# Patient Record
Sex: MALE | Race: WHITE | NOT HISPANIC OR LATINO | Employment: FULL TIME | URBAN - METROPOLITAN AREA
[De-identification: names, ages, dates, MRNs, and addresses within clinical notes are randomized per-mention and may not be internally consistent; named-entity substitution may affect disease eponyms.]

---

## 2023-05-08 ENCOUNTER — TELEPHONE (OUTPATIENT)
Dept: PRIMARY CARE | Facility: CLINIC | Age: 53
End: 2023-05-08
Payer: COMMERCIAL

## 2023-05-13 ENCOUNTER — OFFICE VISIT (OUTPATIENT)
Dept: PRIMARY CARE | Facility: CLINIC | Age: 53
End: 2023-05-13
Payer: COMMERCIAL

## 2023-05-13 VITALS
SYSTOLIC BLOOD PRESSURE: 134 MMHG | BODY MASS INDEX: 45.1 KG/M2 | HEIGHT: 70 IN | WEIGHT: 315 LBS | DIASTOLIC BLOOD PRESSURE: 80 MMHG | OXYGEN SATURATION: 98 % | HEART RATE: 70 BPM

## 2023-05-13 DIAGNOSIS — Z13.0 SCREENING FOR DEFICIENCY ANEMIA: ICD-10-CM

## 2023-05-13 DIAGNOSIS — G47.33 OSA (OBSTRUCTIVE SLEEP APNEA): ICD-10-CM

## 2023-05-13 DIAGNOSIS — E55.9 VITAMIN D INSUFFICIENCY: ICD-10-CM

## 2023-05-13 DIAGNOSIS — K21.9 GASTROESOPHAGEAL REFLUX DISEASE, UNSPECIFIED WHETHER ESOPHAGITIS PRESENT: ICD-10-CM

## 2023-05-13 DIAGNOSIS — Z12.5 PROSTATE CANCER SCREENING: ICD-10-CM

## 2023-05-13 DIAGNOSIS — E78.5 HYPERLIPIDEMIA, UNSPECIFIED HYPERLIPIDEMIA TYPE: ICD-10-CM

## 2023-05-13 DIAGNOSIS — K58.9 IRRITABLE BOWEL SYNDROME, UNSPECIFIED TYPE: ICD-10-CM

## 2023-05-13 DIAGNOSIS — I25.10 CORONARY ARTERY DISEASE, UNSPECIFIED VESSEL OR LESION TYPE, UNSPECIFIED WHETHER ANGINA PRESENT, UNSPECIFIED WHETHER NATIVE OR TRANSPLANTED HEART: ICD-10-CM

## 2023-05-13 DIAGNOSIS — Z13.29 SCREENING FOR THYROID DISORDER: ICD-10-CM

## 2023-05-13 DIAGNOSIS — E66.01 MORBIDLY OBESE (MULTI): ICD-10-CM

## 2023-05-13 DIAGNOSIS — E29.1 HYPOGONADISM, MALE: ICD-10-CM

## 2023-05-13 DIAGNOSIS — Z12.11 COLON CANCER SCREENING: ICD-10-CM

## 2023-05-13 DIAGNOSIS — Z00.00 ROUTINE GENERAL MEDICAL EXAMINATION AT A HEALTH CARE FACILITY: Primary | ICD-10-CM

## 2023-05-13 DIAGNOSIS — Z13.6 ENCOUNTER FOR SCREENING FOR CARDIOVASCULAR DISORDERS: ICD-10-CM

## 2023-05-13 DIAGNOSIS — E53.8 VITAMIN B12 DEFICIENCY: ICD-10-CM

## 2023-05-13 DIAGNOSIS — I10 PRIMARY HYPERTENSION: ICD-10-CM

## 2023-05-13 DIAGNOSIS — E11.69 TYPE 2 DIABETES MELLITUS WITH OTHER SPECIFIED COMPLICATION, UNSPECIFIED WHETHER LONG TERM INSULIN USE (MULTI): ICD-10-CM

## 2023-05-13 PROBLEM — E11.65 UNCONTROLLED TYPE 2 DIABETES MELLITUS WITH HYPERGLYCEMIA (MULTI): Status: ACTIVE | Noted: 2023-05-13

## 2023-05-13 PROBLEM — N52.9 ERECTILE DYSFUNCTION: Status: ACTIVE | Noted: 2023-05-13

## 2023-05-13 PROBLEM — U07.1 COVID-19: Status: RESOLVED | Noted: 2023-05-13 | Resolved: 2023-05-13

## 2023-05-13 PROBLEM — E11.9 DIABETES MELLITUS (MULTI): Status: ACTIVE | Noted: 2023-05-13

## 2023-05-13 PROBLEM — J20.9 ACUTE BRONCHITIS WITH BRONCHOSPASM: Status: ACTIVE | Noted: 2023-05-13

## 2023-05-13 PROBLEM — R05.9 COUGH: Status: ACTIVE | Noted: 2023-05-13

## 2023-05-13 PROBLEM — U07.1 COVID-19: Status: ACTIVE | Noted: 2023-05-13

## 2023-05-13 PROBLEM — J06.9 URI, ACUTE: Status: ACTIVE | Noted: 2023-05-13

## 2023-05-13 PROBLEM — J06.9 URI, ACUTE: Status: RESOLVED | Noted: 2023-05-13 | Resolved: 2023-05-13

## 2023-05-13 PROBLEM — E11.9 NEW ONSET TYPE 2 DIABETES MELLITUS (MULTI): Status: ACTIVE | Noted: 2023-05-13

## 2023-05-13 PROBLEM — M51.37 DDD (DEGENERATIVE DISC DISEASE), LUMBOSACRAL: Status: ACTIVE | Noted: 2023-05-13

## 2023-05-13 PROBLEM — R52 PAIN, ACUTE: Status: RESOLVED | Noted: 2023-05-13 | Resolved: 2023-05-13

## 2023-05-13 PROBLEM — J20.9 ACUTE BRONCHITIS WITH BRONCHOSPASM: Status: RESOLVED | Noted: 2023-05-13 | Resolved: 2023-05-13

## 2023-05-13 PROBLEM — R52 PAIN, ACUTE: Status: ACTIVE | Noted: 2023-05-13

## 2023-05-13 PROBLEM — J01.90 ACUTE SINUSITIS: Status: ACTIVE | Noted: 2023-05-13

## 2023-05-13 PROBLEM — M43.10 SPL (SPONDYLOLISTHESIS): Status: ACTIVE | Noted: 2023-05-13

## 2023-05-13 PROBLEM — J01.90 ACUTE SINUSITIS: Status: RESOLVED | Noted: 2023-05-13 | Resolved: 2023-05-13

## 2023-05-13 PROBLEM — I48.0 PAROXYSMAL ATRIAL FIBRILLATION WITH RVR (MULTI): Status: ACTIVE | Noted: 2023-05-13

## 2023-05-13 PROBLEM — E11.9 NEW ONSET TYPE 2 DIABETES MELLITUS (MULTI): Status: RESOLVED | Noted: 2023-05-13 | Resolved: 2023-05-13

## 2023-05-13 PROBLEM — J21.9 BRONCHIOLITIS: Status: RESOLVED | Noted: 2023-05-13 | Resolved: 2023-05-13

## 2023-05-13 PROBLEM — R05.9 COUGH: Status: RESOLVED | Noted: 2023-05-13 | Resolved: 2023-05-13

## 2023-05-13 PROBLEM — M51.379 DDD (DEGENERATIVE DISC DISEASE), LUMBOSACRAL: Status: ACTIVE | Noted: 2023-05-13

## 2023-05-13 PROBLEM — J21.9 BRONCHIOLITIS: Status: ACTIVE | Noted: 2023-05-13

## 2023-05-13 PROBLEM — Z20.822 SUSPECTED COVID-19 VIRUS INFECTION: Status: RESOLVED | Noted: 2023-05-13 | Resolved: 2023-05-13

## 2023-05-13 PROBLEM — I48.92 ATRIAL FLUTTER WITH RAPID VENTRICULAR RESPONSE (MULTI): Status: ACTIVE | Noted: 2023-05-13

## 2023-05-13 PROBLEM — Z20.822 SUSPECTED COVID-19 VIRUS INFECTION: Status: ACTIVE | Noted: 2023-05-13

## 2023-05-13 PROBLEM — Z95.5 PRESENCE OF STENT IN CORONARY ARTERY: Status: ACTIVE | Noted: 2023-05-13

## 2023-05-13 PROBLEM — S83.249A TEAR OF MEDIAL MENISCUS OF KNEE: Status: ACTIVE | Noted: 2023-05-13

## 2023-05-13 PROBLEM — E11.65 UNCONTROLLED TYPE 2 DIABETES MELLITUS WITH HYPERGLYCEMIA (MULTI): Status: RESOLVED | Noted: 2023-05-13 | Resolved: 2023-05-13

## 2023-05-13 PROCEDURE — 3075F SYST BP GE 130 - 139MM HG: CPT | Performed by: STUDENT IN AN ORGANIZED HEALTH CARE EDUCATION/TRAINING PROGRAM

## 2023-05-13 PROCEDURE — 3079F DIAST BP 80-89 MM HG: CPT | Performed by: STUDENT IN AN ORGANIZED HEALTH CARE EDUCATION/TRAINING PROGRAM

## 2023-05-13 PROCEDURE — 4010F ACE/ARB THERAPY RXD/TAKEN: CPT | Performed by: STUDENT IN AN ORGANIZED HEALTH CARE EDUCATION/TRAINING PROGRAM

## 2023-05-13 PROCEDURE — 99396 PREV VISIT EST AGE 40-64: CPT | Performed by: STUDENT IN AN ORGANIZED HEALTH CARE EDUCATION/TRAINING PROGRAM

## 2023-05-13 RX ORDER — HYDROCHLOROTHIAZIDE 12.5 MG/1
12.5 TABLET ORAL DAILY
COMMUNITY
End: 2024-02-07

## 2023-05-13 RX ORDER — METFORMIN HYDROCHLORIDE 500 MG/1
2 TABLET, EXTENDED RELEASE ORAL 2 TIMES DAILY
COMMUNITY
End: 2023-06-27 | Stop reason: SDUPTHER

## 2023-05-13 RX ORDER — ATORVASTATIN CALCIUM 20 MG/1
20 TABLET, FILM COATED ORAL DAILY
COMMUNITY
End: 2024-02-07

## 2023-05-13 RX ORDER — BLOOD SUGAR DIAGNOSTIC
STRIP MISCELLANEOUS
COMMUNITY
Start: 2019-01-29 | End: 2023-05-13 | Stop reason: ALTCHOICE

## 2023-05-13 RX ORDER — LOSARTAN POTASSIUM 100 MG/1
100 TABLET ORAL DAILY
COMMUNITY
End: 2024-02-07

## 2023-05-13 RX ORDER — ASPIRIN 81 MG/1
1 TABLET ORAL DAILY
COMMUNITY
End: 2023-05-13 | Stop reason: ALTCHOICE

## 2023-05-13 RX ORDER — LOSARTAN POTASSIUM 50 MG/1
TABLET ORAL
COMMUNITY
Start: 2018-12-18 | End: 2023-05-13 | Stop reason: ALTCHOICE

## 2023-05-13 RX ORDER — METOPROLOL TARTRATE 100 MG/1
100 TABLET ORAL 2 TIMES DAILY
COMMUNITY
End: 2024-02-01 | Stop reason: SDUPTHER

## 2023-05-13 RX ORDER — DEXTROSE 4 G
TABLET,CHEWABLE ORAL
Qty: 1 EACH | Refills: 0 | Status: SHIPPED | OUTPATIENT
Start: 2023-05-13

## 2023-05-13 RX ORDER — LANCETS
EACH MISCELLANEOUS
Qty: 100 EACH | Refills: 3 | Status: SHIPPED | OUTPATIENT
Start: 2023-05-13

## 2023-05-13 RX ORDER — TAMSULOSIN HYDROCHLORIDE 0.4 MG/1
0.4 CAPSULE ORAL DAILY
COMMUNITY
Start: 2023-02-02 | End: 2024-05-08 | Stop reason: ALTCHOICE

## 2023-05-13 RX ORDER — TADALAFIL 10 MG/1
10 TABLET ORAL DAILY PRN
COMMUNITY
Start: 2021-08-12

## 2023-05-13 RX ORDER — ERGOCALCIFEROL 1.25 MG/1
1 CAPSULE ORAL
COMMUNITY
Start: 2021-08-12 | End: 2023-05-13 | Stop reason: ALTCHOICE

## 2023-05-13 RX ORDER — BLOOD SUGAR DIAGNOSTIC
STRIP MISCELLANEOUS
Qty: 100 STRIP | Refills: 3 | Status: SHIPPED | OUTPATIENT
Start: 2023-05-13

## 2023-05-13 NOTE — PATIENT INSTRUCTIONS
1.  Wellness visit establish care.  Screening labs ordered today.  Please have labs performed while fasting drinking only water in the morning and close to 8 AM for most accurate testosterone results.  Due for screening colonoscopy ordered today.    2.  Continue routine follow-ups with cardiology and current medications.    3.  Diabetes.  Repeat labs today.  If elevated we will likely add a new injectable such as Ozempic, Trulicity, or Mounjaro.  Diabetes can be progressive (requiring more medications with time). If we can't regulate your Hemoglobin A1C with current meds we will add more in the future and consider Insulin therapy. Persistently elevated blood sugars can damage small blood vessels and nerves in the brain, eyes, heart, kidneys and feet. You should be getting yearly dilated eye exams. You should be inspecting your feet daily for any injuries. Also do not walk bare foot indoors or outside. Weight loss (to a BMI 30) and regular exercise (30 minutes per day) can greatly improve your need for medications for your diabetes. A hemoglobin A1c (3 month blood sugar average) will be checked 1-4 times per year to help monitor you blood sugar control.We are aggressive at treating diabetes because it is a significant risk factor for heart disease and stroke. Please reduce the amount of carbohydrates you are eating, and instead try eating more lean protein such as fish, turkey, and chicken. Please try to follow an ADA (American Diabetes Association) diet.    #4.  Concern for low testosterone.  Levels ordered today.    Below are other ideas to help you boost your testosterone naturally.  Lose weight, increased body fat leads to higher estrogen and lower Testosterone. Eat quality fats and quality cholesterol foods as they are testosterone precursors (olive oil, eggs, almonds, coconut oil, avocados, healthy fish oils). Eat broccoli and cauliflower, as these contain DIM, which is vital in decreasing estrogen response in  the body. Supplements should include Zinc and Vitamin D, these are both important for Testosterone production. Get plenty of sleep, 7 to 9 hours each night. Daily exercise with compound movements, stimulating large muscle groups early in the morning helps naturally boost T (Squats, bench, dead lifts…etc). Workouts should have short breaks, failure at 6 to 10 reps, and not last longer than 45 mins.  Focus on lowering stress, high cortisol the stress hormone can contribute to suppressing your body´s own testosterone. Avoid Vegetable oils, avoid non-fat yogurts, avoid flax seed, avoid Trans Fats, no hydrogenated fats. Healthy natural testosterone boosters include Tribulus terrestres, Ashwaghanda, Eurycoma longifolia, and Epimedium (horny goat weed).     #5.  Symptoms of diarrhea predominant IBS.  Most likely related to food.  Advised on avoiding gluten products.  Advised on daily probiotics.  Consider daily fiber supplements.

## 2023-05-13 NOTE — PROGRESS NOTES
"Subjective   Patient ID: Vipul Townsend is a 52 y.o. male who presents for New Patient Visit and GI Problem.    HPI comes in to establish care with a new PCP.  Due for wellness labs.  History of diabetes, heart disease.  Acid reflux.  Concern for low T.    Review of Systems  Constitutional: NO F, chills, or sweats  Eyes: no blurred vision or visual disturbance  ENT: no hearing loss, no congestion, no nasal discharge, no hoarseness and no sore throat.   Cardiovascular: no chest pain, no edema, no palps and no syncope.   Respiratory: no cough,no s.o.b. and no wheezing  Gastrointestinal: no abdominal pain, No C/D no N/V, no blood in stools  Genitourinary: no dysuria, no change in urinary frequency, no urinary hesitancy and no feelings of urinary urgency.   Musculoskeletal: no arthralgias,  no back pain and no myalgias.   Integumentary: no new skin lesions and no rashes.   Neurological: no difficulty walking, no headache, no limb weakness, no numbness and no tingling.   Psychiatric: no anxiety, no depression, no anhedonia and no substance use disorders.   Endocrine: no recent weight gain and no recent weight loss.   Hematologic/Lymphatic: no tendency for easy bruising and no swollen glands.  Objective   /80 (BP Location: Left arm, Patient Position: Sitting, BP Cuff Size: Large adult)   Pulse 70   Ht 1.775 m (5' 9.88\")   Wt 144 kg (317 lb 3.2 oz)   SpO2 98%   BMI 45.67 kg/m²     Physical Exam  gen- a & o x 3, nad, pleasant  heent- eomi, perrla, ear canals patent, TM's non-erythematous, no fluid, frontal and maxillary sinus's nontender  neck- supple, nontender, no palpable or enlarged nodes, no thyromegaly  heart- rrr, no murmurs  lungs- cta b/l , no w/r/r  chest- symmetric, nontender  ab- soft, nontender, no palpable organomegaly, postive bowel sounds  ex's- no c/c/e  neuro- CNs 2-12 grossly intact, full sensation and strength in all extremities    Assessment/Plan     1.  Wellness visit establish care.  Screening " labs ordered today.  Please have labs performed while fasting drinking only water in the morning and close to 8 AM for most accurate testosterone results.  Due for screening colonoscopy ordered today.    2.  Continue routine follow-ups with cardiology and current medications.    3.  Diabetes.  Repeat labs today.  If elevated we will likely add a new injectable such as Ozempic, Trulicity, or Mounjaro.  Diabetes can be progressive (requiring more medications with time). If we can't regulate your Hemoglobin A1C with current meds we will add more in the future and consider Insulin therapy. Persistently elevated blood sugars can damage small blood vessels and nerves in the brain, eyes, heart, kidneys and feet. You should be getting yearly dilated eye exams. You should be inspecting your feet daily for any injuries. Also do not walk bare foot indoors or outside. Weight loss (to a BMI 30) and regular exercise (30 minutes per day) can greatly improve your need for medications for your diabetes. A hemoglobin A1c (3 month blood sugar average) will be checked 1-4 times per year to help monitor you blood sugar control.We are aggressive at treating diabetes because it is a significant risk factor for heart disease and stroke. Please reduce the amount of carbohydrates you are eating, and instead try eating more lean protein such as fish, turkey, and chicken. Please try to follow an ADA (American Diabetes Association) diet.    #4.  Concern for low testosterone.  Levels ordered today.    Below are other ideas to help you boost your testosterone naturally.  Lose weight, increased body fat leads to higher estrogen and lower Testosterone. Eat quality fats and quality cholesterol foods as they are testosterone precursors (olive oil, eggs, almonds, coconut oil, avocados, healthy fish oils). Eat broccoli and cauliflower, as these contain DIM, which is vital in decreasing estrogen response in the body. Supplements should include Zinc and  Vitamin D, these are both important for Testosterone production. Get plenty of sleep, 7 to 9 hours each night. Daily exercise with compound movements, stimulating large muscle groups early in the morning helps naturally boost T (Squats, bench, dead lifts…etc). Workouts should have short breaks, failure at 6 to 10 reps, and not last longer than 45 mins.  Focus on lowering stress, high cortisol the stress hormone can contribute to suppressing your body´s own testosterone. Avoid Vegetable oils, avoid non-fat yogurts, avoid flax seed, avoid Trans Fats, no hydrogenated fats. Healthy natural testosterone boosters include Tribulus terrestres, Ashwaghanda, Eurycoma longifolia, and Epimedium (horny goat weed).     #5.  Symptoms of diarrhea predominant IBS.  Most likely related to food.  Advised on avoiding gluten products.  Advised on daily probiotics.  Consider daily fiber supplements.

## 2023-05-17 ENCOUNTER — LAB (OUTPATIENT)
Dept: LAB | Facility: LAB | Age: 53
End: 2023-05-17
Payer: COMMERCIAL

## 2023-05-17 DIAGNOSIS — I25.10 CORONARY ARTERY DISEASE, UNSPECIFIED VESSEL OR LESION TYPE, UNSPECIFIED WHETHER ANGINA PRESENT, UNSPECIFIED WHETHER NATIVE OR TRANSPLANTED HEART: ICD-10-CM

## 2023-05-17 DIAGNOSIS — K58.9 IRRITABLE BOWEL SYNDROME, UNSPECIFIED TYPE: ICD-10-CM

## 2023-05-17 DIAGNOSIS — G47.33 OSA (OBSTRUCTIVE SLEEP APNEA): ICD-10-CM

## 2023-05-17 DIAGNOSIS — E29.1 HYPOGONADISM, MALE: ICD-10-CM

## 2023-05-17 DIAGNOSIS — Z12.5 PROSTATE CANCER SCREENING: ICD-10-CM

## 2023-05-17 DIAGNOSIS — E78.5 HYPERLIPIDEMIA, UNSPECIFIED HYPERLIPIDEMIA TYPE: ICD-10-CM

## 2023-05-17 DIAGNOSIS — Z13.6 ENCOUNTER FOR SCREENING FOR CARDIOVASCULAR DISORDERS: ICD-10-CM

## 2023-05-17 DIAGNOSIS — Z00.00 ROUTINE GENERAL MEDICAL EXAMINATION AT A HEALTH CARE FACILITY: ICD-10-CM

## 2023-05-17 DIAGNOSIS — Z12.11 COLON CANCER SCREENING: ICD-10-CM

## 2023-05-17 DIAGNOSIS — Z13.29 SCREENING FOR THYROID DISORDER: ICD-10-CM

## 2023-05-17 DIAGNOSIS — E55.9 VITAMIN D INSUFFICIENCY: ICD-10-CM

## 2023-05-17 DIAGNOSIS — Z13.0 SCREENING FOR DEFICIENCY ANEMIA: ICD-10-CM

## 2023-05-17 DIAGNOSIS — E66.01 MORBIDLY OBESE (MULTI): ICD-10-CM

## 2023-05-17 DIAGNOSIS — K21.9 GASTROESOPHAGEAL REFLUX DISEASE, UNSPECIFIED WHETHER ESOPHAGITIS PRESENT: ICD-10-CM

## 2023-05-17 DIAGNOSIS — E53.8 VITAMIN B12 DEFICIENCY: ICD-10-CM

## 2023-05-17 DIAGNOSIS — I10 PRIMARY HYPERTENSION: ICD-10-CM

## 2023-05-17 DIAGNOSIS — E11.69 TYPE 2 DIABETES MELLITUS WITH OTHER SPECIFIED COMPLICATION, UNSPECIFIED WHETHER LONG TERM INSULIN USE (MULTI): ICD-10-CM

## 2023-05-17 LAB
ALANINE AMINOTRANSFERASE (SGPT) (U/L) IN SER/PLAS: 28 U/L (ref 10–52)
ALBUMIN (G/DL) IN SER/PLAS: 4.7 G/DL (ref 3.4–5)
ALKALINE PHOSPHATASE (U/L) IN SER/PLAS: 49 U/L (ref 33–120)
ANION GAP IN SER/PLAS: 15 MMOL/L (ref 10–20)
APPEARANCE, URINE: ABNORMAL
ASPARTATE AMINOTRANSFERASE (SGOT) (U/L) IN SER/PLAS: 20 U/L (ref 9–39)
BILIRUBIN TOTAL (MG/DL) IN SER/PLAS: 0.6 MG/DL (ref 0–1.2)
BILIRUBIN, URINE: NEGATIVE
BLOOD, URINE: NEGATIVE
CALCIDIOL (25 OH VITAMIN D3) (NG/ML) IN SER/PLAS: 31 NG/ML
CALCIUM (MG/DL) IN SER/PLAS: 10.1 MG/DL (ref 8.6–10.6)
CALCIUM OXALATE CRYSTALS, URINE: NORMAL /HPF
CARBON DIOXIDE, TOTAL (MMOL/L) IN SER/PLAS: 28 MMOL/L (ref 21–32)
CHLORIDE (MMOL/L) IN SER/PLAS: 98 MMOL/L (ref 98–107)
CHOLESTEROL (MG/DL) IN SER/PLAS: 92 MG/DL (ref 0–199)
CHOLESTEROL IN HDL (MG/DL) IN SER/PLAS: 44.1 MG/DL
CHOLESTEROL/HDL RATIO: 2.1
COBALAMIN (VITAMIN B12) (PG/ML) IN SER/PLAS: 282 PG/ML (ref 211–911)
COLOR, URINE: YELLOW
CREATININE (MG/DL) IN SER/PLAS: 0.84 MG/DL (ref 0.5–1.3)
ESTIMATED AVERAGE GLUCOSE FOR HBA1C: 232 MG/DL
GFR MALE: >90 ML/MIN/1.73M2
GLUCOSE (MG/DL) IN SER/PLAS: 271 MG/DL (ref 74–99)
GLUCOSE, URINE: ABNORMAL MG/DL
HEMOGLOBIN A1C/HEMOGLOBIN TOTAL IN BLOOD: 9.7 %
KETONES, URINE: ABNORMAL MG/DL
LDL: 28 MG/DL (ref 0–99)
LEUKOCYTE ESTERASE, URINE: NEGATIVE
NITRITE, URINE: NEGATIVE
PH, URINE: 5 (ref 5–8)
POTASSIUM (MMOL/L) IN SER/PLAS: 4.2 MMOL/L (ref 3.5–5.3)
PROTEIN TOTAL: 7.4 G/DL (ref 6.4–8.2)
PROTEIN, URINE: ABNORMAL MG/DL
RBC, URINE: 3 /HPF (ref 0–5)
SODIUM (MMOL/L) IN SER/PLAS: 137 MMOL/L (ref 136–145)
SPECIFIC GRAVITY, URINE: 1.03 (ref 1–1.03)
THYROTROPIN (MIU/L) IN SER/PLAS BY DETECTION LIMIT <= 0.05 MIU/L: 1.39 MIU/L (ref 0.44–3.98)
TRIGLYCERIDE (MG/DL) IN SER/PLAS: 100 MG/DL (ref 0–149)
UREA NITROGEN (MG/DL) IN SER/PLAS: 12 MG/DL (ref 6–23)
UROBILINOGEN, URINE: <2 MG/DL (ref 0–1.9)
VLDL: 20 MG/DL (ref 0–40)
WBC, URINE: NORMAL /HPF (ref 0–5)

## 2023-05-17 PROCEDURE — 83036 HEMOGLOBIN GLYCOSYLATED A1C: CPT

## 2023-05-17 PROCEDURE — 81001 URINALYSIS AUTO W/SCOPE: CPT

## 2023-05-17 PROCEDURE — 36415 COLL VENOUS BLD VENIPUNCTURE: CPT

## 2023-05-17 PROCEDURE — 84402 ASSAY OF FREE TESTOSTERONE: CPT

## 2023-05-17 PROCEDURE — 84154 ASSAY OF PSA FREE: CPT

## 2023-05-17 PROCEDURE — 84443 ASSAY THYROID STIM HORMONE: CPT

## 2023-05-17 PROCEDURE — 80061 LIPID PANEL: CPT

## 2023-05-17 PROCEDURE — 82607 VITAMIN B-12: CPT

## 2023-05-17 PROCEDURE — 80053 COMPREHEN METABOLIC PANEL: CPT

## 2023-05-17 PROCEDURE — 84403 ASSAY OF TOTAL TESTOSTERONE: CPT

## 2023-05-17 PROCEDURE — 82306 VITAMIN D 25 HYDROXY: CPT

## 2023-05-17 PROCEDURE — 84153 ASSAY OF PSA TOTAL: CPT

## 2023-05-17 PROCEDURE — 86003 ALLG SPEC IGE CRUDE XTRC EA: CPT

## 2023-05-17 PROCEDURE — 86001 ALLERGEN SPECIFIC IGG: CPT

## 2023-05-18 LAB
ALLERGEN FOOD: GLUTEN IGE (KU/L): <0.1 KU/L
IMMUNOCAP INTERPRETATION: NORMAL

## 2023-05-22 LAB
PROSTATE SPECIFIC AG (NG/ML) IN SER/PLAS: 0.5 NG/ML (ref 0–4)
PROSTATE SPECIFIC AG FREE (NG/ML) IN SER/PLAS: 0.2 NG/ML
PROSTATE SPECIFIC AG FREE/PROSTATE SPECIFIC AG TOTAL IN SER/PLAS: 40 %

## 2023-05-23 LAB — GLUTEN IGG: 4.13 MCG/ML

## 2023-05-24 LAB
TESTOSTERONE FREE (CHAN): 36.2 PG/ML (ref 35–155)
TESTOSTERONE,TOTAL,LC-MS/MS: 248 NG/DL (ref 250–1100)

## 2023-05-26 ENCOUNTER — TELEPHONE (OUTPATIENT)
Dept: PRIMARY CARE | Facility: CLINIC | Age: 53
End: 2023-05-26
Payer: COMMERCIAL

## 2023-05-30 DIAGNOSIS — E11.69 TYPE 2 DIABETES MELLITUS WITH OTHER SPECIFIED COMPLICATION, UNSPECIFIED WHETHER LONG TERM INSULIN USE (MULTI): Primary | ICD-10-CM

## 2023-05-30 RX ORDER — TIRZEPATIDE 2.5 MG/.5ML
2.5 INJECTION, SOLUTION SUBCUTANEOUS
Qty: 2 ML | Refills: 0 | Status: SHIPPED | OUTPATIENT
Start: 2023-05-30 | End: 2023-06-27 | Stop reason: ALTCHOICE

## 2023-06-05 ENCOUNTER — HOSPITAL ENCOUNTER (OUTPATIENT)
Dept: DATA CONVERSION | Facility: HOSPITAL | Age: 53
End: 2023-06-05
Attending: INTERNAL MEDICINE | Admitting: INTERNAL MEDICINE
Payer: COMMERCIAL

## 2023-06-05 DIAGNOSIS — I11.0 HYPERTENSIVE HEART DISEASE WITH HEART FAILURE (MULTI): ICD-10-CM

## 2023-06-05 DIAGNOSIS — E11.9 TYPE 2 DIABETES MELLITUS WITHOUT COMPLICATIONS (MULTI): ICD-10-CM

## 2023-06-05 DIAGNOSIS — Z98.61 CORONARY ANGIOPLASTY STATUS: ICD-10-CM

## 2023-06-05 DIAGNOSIS — Z79.84 LONG TERM (CURRENT) USE OF ORAL HYPOGLYCEMIC DRUGS: ICD-10-CM

## 2023-06-05 DIAGNOSIS — D12.4 BENIGN NEOPLASM OF DESCENDING COLON: ICD-10-CM

## 2023-06-05 DIAGNOSIS — K63.5 POLYP OF COLON: ICD-10-CM

## 2023-06-05 DIAGNOSIS — I48.0 PAROXYSMAL ATRIAL FIBRILLATION (MULTI): ICD-10-CM

## 2023-06-05 DIAGNOSIS — E78.5 HYPERLIPIDEMIA, UNSPECIFIED: ICD-10-CM

## 2023-06-05 DIAGNOSIS — I25.10 ATHEROSCLEROTIC HEART DISEASE OF NATIVE CORONARY ARTERY WITHOUT ANGINA PECTORIS: ICD-10-CM

## 2023-06-05 DIAGNOSIS — Z87.891 PERSONAL HISTORY OF NICOTINE DEPENDENCE: ICD-10-CM

## 2023-06-05 DIAGNOSIS — D12.3 BENIGN NEOPLASM OF TRANSVERSE COLON: ICD-10-CM

## 2023-06-05 DIAGNOSIS — E66.9 OBESITY, UNSPECIFIED: ICD-10-CM

## 2023-06-05 DIAGNOSIS — G47.33 OBSTRUCTIVE SLEEP APNEA (ADULT) (PEDIATRIC): ICD-10-CM

## 2023-06-05 DIAGNOSIS — Z12.11 ENCOUNTER FOR SCREENING FOR MALIGNANT NEOPLASM OF COLON: ICD-10-CM

## 2023-06-05 DIAGNOSIS — D12.0 BENIGN NEOPLASM OF CECUM: ICD-10-CM

## 2023-06-05 DIAGNOSIS — I50.22 CHRONIC SYSTOLIC (CONGESTIVE) HEART FAILURE (MULTI): ICD-10-CM

## 2023-06-05 DIAGNOSIS — K64.9 UNSPECIFIED HEMORRHOIDS: ICD-10-CM

## 2023-06-05 DIAGNOSIS — I48.3 TYPICAL ATRIAL FLUTTER (MULTI): ICD-10-CM

## 2023-06-05 LAB — POCT GLUCOSE: 195 MG/DL (ref 74–99)

## 2023-06-09 LAB
COMPLETE PATHOLOGY REPORT: NORMAL
CONVERTED CLINICAL DIAGNOSIS-HISTORY: NORMAL
CONVERTED FINAL DIAGNOSIS: NORMAL
CONVERTED FINAL REPORT PDF LINK TO COPY AND PASTE: NORMAL
CONVERTED GROSS DESCRIPTION: NORMAL
CONVERTED MICROSCOPIC DESCRIPTION: NORMAL

## 2023-06-14 ENCOUNTER — TELEPHONE (OUTPATIENT)
Dept: PRIMARY CARE | Facility: CLINIC | Age: 53
End: 2023-06-14
Payer: COMMERCIAL

## 2023-06-14 RX ORDER — SOD SULF/POT CHLORIDE/MAG SULF 1.479 G
TABLET ORAL
COMMUNITY
Start: 2023-05-15 | End: 2024-05-08 | Stop reason: ALTCHOICE

## 2023-07-28 ENCOUNTER — TELEPHONE (OUTPATIENT)
Dept: PRIMARY CARE | Facility: CLINIC | Age: 53
End: 2023-07-28
Payer: COMMERCIAL

## 2023-10-17 RX ORDER — ERGOCALCIFEROL 1.25 MG/1
1.25 CAPSULE ORAL
COMMUNITY
End: 2024-05-08 | Stop reason: ALTCHOICE

## 2023-10-17 RX ORDER — ASPIRIN 81 MG/1
81 TABLET ORAL DAILY
COMMUNITY
End: 2024-05-08 | Stop reason: ALTCHOICE

## 2023-10-18 ENCOUNTER — APPOINTMENT (OUTPATIENT)
Dept: CARDIOLOGY | Facility: CLINIC | Age: 53
End: 2023-10-18
Payer: COMMERCIAL

## 2023-11-20 DIAGNOSIS — E11.69 TYPE 2 DIABETES MELLITUS WITH OTHER SPECIFIED COMPLICATION, UNSPECIFIED WHETHER LONG TERM INSULIN USE (MULTI): ICD-10-CM

## 2023-11-20 RX ORDER — TIRZEPATIDE 7.5 MG/.5ML
INJECTION, SOLUTION SUBCUTANEOUS
Qty: 2 ML | Refills: 3 | Status: SHIPPED | OUTPATIENT
Start: 2023-11-20 | End: 2024-03-20

## 2024-02-01 DIAGNOSIS — I48.92 ATRIAL FLUTTER WITH RAPID VENTRICULAR RESPONSE (MULTI): ICD-10-CM

## 2024-02-01 RX ORDER — METOPROLOL TARTRATE 100 MG/1
100 TABLET ORAL 2 TIMES DAILY
Qty: 180 TABLET | Refills: 3 | Status: SHIPPED | OUTPATIENT
Start: 2024-02-01 | End: 2024-02-08 | Stop reason: SDUPTHER

## 2024-02-07 DIAGNOSIS — I10 PRIMARY HYPERTENSION: ICD-10-CM

## 2024-02-07 DIAGNOSIS — E78.5 HYPERLIPIDEMIA, UNSPECIFIED HYPERLIPIDEMIA TYPE: ICD-10-CM

## 2024-02-07 RX ORDER — ATORVASTATIN CALCIUM 20 MG/1
20 TABLET, FILM COATED ORAL DAILY
Qty: 90 TABLET | Refills: 3 | Status: SHIPPED | OUTPATIENT
Start: 2024-02-07 | End: 2024-02-08 | Stop reason: SDUPTHER

## 2024-02-07 RX ORDER — LOSARTAN POTASSIUM 100 MG/1
100 TABLET ORAL DAILY
Qty: 90 TABLET | Refills: 3 | Status: SHIPPED | OUTPATIENT
Start: 2024-02-07 | End: 2024-02-08 | Stop reason: SDUPTHER

## 2024-02-07 RX ORDER — HYDROCHLOROTHIAZIDE 12.5 MG/1
12.5 TABLET ORAL DAILY
Qty: 90 TABLET | Refills: 3 | Status: SHIPPED | OUTPATIENT
Start: 2024-02-07 | End: 2024-02-08 | Stop reason: SDUPTHER

## 2024-02-08 ENCOUNTER — OFFICE VISIT (OUTPATIENT)
Dept: CARDIOLOGY | Facility: CLINIC | Age: 54
End: 2024-02-08
Payer: COMMERCIAL

## 2024-02-08 VITALS
BODY MASS INDEX: 40.23 KG/M2 | DIASTOLIC BLOOD PRESSURE: 80 MMHG | HEIGHT: 72 IN | HEART RATE: 96 BPM | OXYGEN SATURATION: 98 % | WEIGHT: 297 LBS | SYSTOLIC BLOOD PRESSURE: 130 MMHG

## 2024-02-08 DIAGNOSIS — I10 PRIMARY HYPERTENSION: ICD-10-CM

## 2024-02-08 DIAGNOSIS — I48.92 ATRIAL FLUTTER WITH RAPID VENTRICULAR RESPONSE (MULTI): ICD-10-CM

## 2024-02-08 DIAGNOSIS — I25.10 CORONARY ARTERY DISEASE INVOLVING NATIVE CORONARY ARTERY OF NATIVE HEART WITHOUT ANGINA PECTORIS: ICD-10-CM

## 2024-02-08 DIAGNOSIS — Z95.5 PRESENCE OF STENT IN CORONARY ARTERY: ICD-10-CM

## 2024-02-08 DIAGNOSIS — E78.5 HYPERLIPIDEMIA, UNSPECIFIED HYPERLIPIDEMIA TYPE: ICD-10-CM

## 2024-02-08 DIAGNOSIS — I15.9 SECONDARY HYPERTENSION: Primary | ICD-10-CM

## 2024-02-08 DIAGNOSIS — I48.0 PAROXYSMAL ATRIAL FIBRILLATION WITH RVR (MULTI): ICD-10-CM

## 2024-02-08 PROCEDURE — 4010F ACE/ARB THERAPY RXD/TAKEN: CPT | Performed by: INTERNAL MEDICINE

## 2024-02-08 PROCEDURE — 99213 OFFICE O/P EST LOW 20 MIN: CPT | Performed by: INTERNAL MEDICINE

## 2024-02-08 PROCEDURE — 3075F SYST BP GE 130 - 139MM HG: CPT | Performed by: INTERNAL MEDICINE

## 2024-02-08 PROCEDURE — 3079F DIAST BP 80-89 MM HG: CPT | Performed by: INTERNAL MEDICINE

## 2024-02-08 PROCEDURE — 3008F BODY MASS INDEX DOCD: CPT | Performed by: INTERNAL MEDICINE

## 2024-02-08 PROCEDURE — 93000 ELECTROCARDIOGRAM COMPLETE: CPT | Performed by: INTERNAL MEDICINE

## 2024-02-08 PROCEDURE — 1036F TOBACCO NON-USER: CPT | Performed by: INTERNAL MEDICINE

## 2024-02-08 RX ORDER — ATORVASTATIN CALCIUM 20 MG/1
20 TABLET, FILM COATED ORAL DAILY
Qty: 90 TABLET | Refills: 3 | Status: SHIPPED | OUTPATIENT
Start: 2024-02-08

## 2024-02-08 RX ORDER — LOSARTAN POTASSIUM 100 MG/1
100 TABLET ORAL DAILY
Qty: 90 TABLET | Refills: 3 | Status: SHIPPED | OUTPATIENT
Start: 2024-02-08

## 2024-02-08 RX ORDER — HYDROCHLOROTHIAZIDE 12.5 MG/1
12.5 TABLET ORAL DAILY
Qty: 90 TABLET | Refills: 3 | Status: SHIPPED | OUTPATIENT
Start: 2024-02-08

## 2024-02-08 RX ORDER — METOPROLOL TARTRATE 100 MG/1
100 TABLET ORAL 2 TIMES DAILY
Qty: 180 TABLET | Refills: 3 | Status: SHIPPED | OUTPATIENT
Start: 2024-02-08

## 2024-02-08 ASSESSMENT — ENCOUNTER SYMPTOMS
DYSPNEA ON EXERTION: 1
WEIGHT LOSS: 1

## 2024-02-08 NOTE — PROGRESS NOTES
Subjective   Vipul Townsend is a 53 y.o. male.    Chief Complaint:  Follow-up coronary artery disease, atrial fibrillation.    HPI    He has had rare episodes of palpitations.  They last briefly for seconds.  He has not had any sustained arrhythmias.  No chest pressure or heaviness.  No increased shortness of breath from his baseline.  Has lost a considerable amount of weight.  He feels much better after weight loss.  No other medical problems or hospitalizations since his last visit.    Cardiac catheterization in December 2019 which demonstrated mild coronary artery disease and patent stents in the left anterior descending coronary artery.     In 2020 the patient underwent a pulmonary vein isolation procedure. Subsequent to that he has been able to maintain sinus rhythm.     A stress thallium study in early December 2019 showed a left ventricular ejection fraction of 38%. An echocardiographic study performed on June 2, 2020 showed normal left ventricular systolic function with no significant valvular disease.     Cardiac catheterization in 2012 showed a /99 percent left anterior descending coronary artery treated with a drug-eluting stent.     He has obstructive sleep apnea and uses a cpap mask.    Allergies  Medication    · Trulicity SOPN   Recorded By: iLu Tapia; 8/31/2015 2:53:32 PM     Family History  Mother    · Family history of diabetes mellitus (V18.0) (Z83.3)  Father    · Family history of cardiac disorder (V17.49) (Z82.49)   · Family history of myocardial infarction (V17.3) (Z82.49)  Sister    · Family history of diabetes mellitus (V18.0) (Z83.3)  Maternal Grandmother    · Family history of diabetes mellitus (V18.0) (Z83.3)  Paternal Grandmother    · Family history of congestive heart failure (V17.49) (Z82.49)  Paternal Grandfather    · Family history of hypertension (V17.49) (Z82.49)     Social History  Problems    · Former smoker (V15.82) (Z87.891)   · quit 2009   · History of marijuana  use (305.23) (F12.91)   · quit 2015   ·    · Moderate alcohol use   · Nonsmoker (V49.89) (Z78.9)     Review of Systems   Constitutional: Positive for weight loss.   Cardiovascular:  Positive for dyspnea on exertion.   Musculoskeletal:  Positive for arthritis.   All other systems reviewed and are negative.      Visit Vitals  /80 (BP Location: Left arm)   Pulse 96   Ht 1.829 m (6')   Wt 135 kg (297 lb)   SpO2 98%   BMI 40.28 kg/m²   Smoking Status Former   BSA 2.62 m²        Objective     Constitutional:       Appearance: Not in distress.   Neck:      Vascular: JVD normal.   Pulmonary:      Breath sounds: Normal breath sounds.   Cardiovascular:      Normal rate. Regular rhythm. S1 with normal intensity. S2 with normal intensity.       Murmurs: There is no murmur.      No gallop.    Pulses:     Intact distal pulses.   Edema:     Peripheral edema absent.   Abdominal:      General: Bowel sounds are normal.   Neurological:      Mental Status: Alert and oriented to person, place and time.         Lab Review:   Lab Results   Component Value Date     05/17/2023    K 4.2 05/17/2023    CL 98 05/17/2023    CO2 28 05/17/2023    BUN 12 05/17/2023    CREATININE 0.84 05/17/2023    GLUCOSE 271 (H) 05/17/2023    CALCIUM 10.1 05/17/2023     Lab Results   Component Value Date    CHOL 92 05/17/2023    TRIG 100 05/17/2023    HDL 44.1 05/17/2023       Assessment:    1.  Coronary artery disease.  No anginal symptoms.  Activity levels are improving with weight loss.  Will continue current medical management which includes angiotensin receptor blocker, beta-blockers, and statin therapy    2.  Hypertension.  Blood pressures are well-controlled.    3.  Hyperlipidemia.  Most recent LDL is 28.

## 2024-02-28 ENCOUNTER — LAB (OUTPATIENT)
Dept: LAB | Facility: LAB | Age: 54
End: 2024-02-28
Payer: COMMERCIAL

## 2024-02-28 DIAGNOSIS — E66.01 MORBIDLY OBESE (MULTI): ICD-10-CM

## 2024-02-28 DIAGNOSIS — E53.8 VITAMIN B12 DEFICIENCY: ICD-10-CM

## 2024-02-28 DIAGNOSIS — Z12.11 COLON CANCER SCREENING: ICD-10-CM

## 2024-02-28 DIAGNOSIS — E78.5 HYPERLIPIDEMIA, UNSPECIFIED HYPERLIPIDEMIA TYPE: ICD-10-CM

## 2024-02-28 DIAGNOSIS — I25.10 CORONARY ARTERY DISEASE, UNSPECIFIED VESSEL OR LESION TYPE, UNSPECIFIED WHETHER ANGINA PRESENT, UNSPECIFIED WHETHER NATIVE OR TRANSPLANTED HEART: ICD-10-CM

## 2024-02-28 DIAGNOSIS — E55.9 VITAMIN D INSUFFICIENCY: ICD-10-CM

## 2024-02-28 DIAGNOSIS — I10 PRIMARY HYPERTENSION: ICD-10-CM

## 2024-02-28 DIAGNOSIS — E29.1 HYPOGONADISM, MALE: ICD-10-CM

## 2024-02-28 DIAGNOSIS — Z13.29 SCREENING FOR THYROID DISORDER: ICD-10-CM

## 2024-02-28 DIAGNOSIS — K21.9 GASTROESOPHAGEAL REFLUX DISEASE, UNSPECIFIED WHETHER ESOPHAGITIS PRESENT: ICD-10-CM

## 2024-02-28 DIAGNOSIS — G47.33 OSA (OBSTRUCTIVE SLEEP APNEA): ICD-10-CM

## 2024-02-28 DIAGNOSIS — Z13.6 ENCOUNTER FOR SCREENING FOR CARDIOVASCULAR DISORDERS: ICD-10-CM

## 2024-02-28 DIAGNOSIS — Z13.0 SCREENING FOR DEFICIENCY ANEMIA: ICD-10-CM

## 2024-02-28 DIAGNOSIS — Z12.5 PROSTATE CANCER SCREENING: ICD-10-CM

## 2024-02-28 DIAGNOSIS — Z00.00 ROUTINE GENERAL MEDICAL EXAMINATION AT A HEALTH CARE FACILITY: ICD-10-CM

## 2024-02-28 DIAGNOSIS — E11.69 TYPE 2 DIABETES MELLITUS WITH OTHER SPECIFIED COMPLICATION, UNSPECIFIED WHETHER LONG TERM INSULIN USE (MULTI): ICD-10-CM

## 2024-02-28 LAB
ALBUMIN SERPL BCP-MCNC: 4.4 G/DL (ref 3.4–5)
ALP SERPL-CCNC: 43 U/L (ref 33–120)
ALT SERPL W P-5'-P-CCNC: 20 U/L (ref 10–52)
ANION GAP SERPL CALC-SCNC: 16 MMOL/L (ref 10–20)
APPEARANCE UR: ABNORMAL
AST SERPL W P-5'-P-CCNC: 14 U/L (ref 9–39)
BASOPHILS # BLD AUTO: 0.09 X10*3/UL (ref 0–0.1)
BASOPHILS NFR BLD AUTO: 1.2 %
BILIRUB SERPL-MCNC: 0.5 MG/DL (ref 0–1.2)
BILIRUB UR STRIP.AUTO-MCNC: NEGATIVE MG/DL
BUN SERPL-MCNC: 12 MG/DL (ref 6–23)
CALCIUM SERPL-MCNC: 9.7 MG/DL (ref 8.6–10.6)
CHLORIDE SERPL-SCNC: 102 MMOL/L (ref 98–107)
CO2 SERPL-SCNC: 27 MMOL/L (ref 21–32)
COLOR UR: ABNORMAL
CREAT SERPL-MCNC: 0.73 MG/DL (ref 0.5–1.3)
EGFRCR SERPLBLD CKD-EPI 2021: >90 ML/MIN/1.73M*2
EOSINOPHIL # BLD AUTO: 0.33 X10*3/UL (ref 0–0.7)
EOSINOPHIL NFR BLD AUTO: 4.5 %
ERYTHROCYTE [DISTWIDTH] IN BLOOD BY AUTOMATED COUNT: 12.9 % (ref 11.5–14.5)
EST. AVERAGE GLUCOSE BLD GHB EST-MCNC: 143 MG/DL
GLUCOSE SERPL-MCNC: 156 MG/DL (ref 74–99)
GLUCOSE UR STRIP.AUTO-MCNC: ABNORMAL MG/DL
HBA1C MFR BLD: 6.6 %
HCT VFR BLD AUTO: 43.5 % (ref 41–52)
HGB BLD-MCNC: 14.6 G/DL (ref 13.5–17.5)
IMM GRANULOCYTES # BLD AUTO: 0.02 X10*3/UL (ref 0–0.7)
IMM GRANULOCYTES NFR BLD AUTO: 0.3 % (ref 0–0.9)
KETONES UR STRIP.AUTO-MCNC: ABNORMAL MG/DL
LEUKOCYTE ESTERASE UR QL STRIP.AUTO: NEGATIVE
LYMPHOCYTES # BLD AUTO: 1.92 X10*3/UL (ref 1.2–4.8)
LYMPHOCYTES NFR BLD AUTO: 25.9 %
MCH RBC QN AUTO: 29.7 PG (ref 26–34)
MCHC RBC AUTO-ENTMCNC: 33.6 G/DL (ref 32–36)
MCV RBC AUTO: 89 FL (ref 80–100)
MONOCYTES # BLD AUTO: 0.5 X10*3/UL (ref 0.1–1)
MONOCYTES NFR BLD AUTO: 6.7 %
NEUTROPHILS # BLD AUTO: 4.55 X10*3/UL (ref 1.2–7.7)
NEUTROPHILS NFR BLD AUTO: 61.4 %
NITRITE UR QL STRIP.AUTO: NEGATIVE
NRBC BLD-RTO: 0 /100 WBCS (ref 0–0)
PH UR STRIP.AUTO: 5.5 [PH]
PLATELET # BLD AUTO: 208 X10*3/UL (ref 150–450)
POTASSIUM SERPL-SCNC: 4.2 MMOL/L (ref 3.5–5.3)
PROT SERPL-MCNC: 6.9 G/DL (ref 6.4–8.2)
PROT UR STRIP.AUTO-MCNC: ABNORMAL MG/DL
RBC # BLD AUTO: 4.91 X10*6/UL (ref 4.5–5.9)
RBC # UR STRIP.AUTO: NEGATIVE /UL
RBC #/AREA URNS AUTO: NORMAL /HPF
SODIUM SERPL-SCNC: 141 MMOL/L (ref 136–145)
SP GR UR STRIP.AUTO: 1.03
TSH SERPL-ACNC: 1.09 MIU/L (ref 0.44–3.98)
UROBILINOGEN UR STRIP.AUTO-MCNC: NORMAL MG/DL
WBC # BLD AUTO: 7.4 X10*3/UL (ref 4.4–11.3)
WBC #/AREA URNS AUTO: NORMAL /HPF

## 2024-02-28 PROCEDURE — 85025 COMPLETE CBC W/AUTO DIFF WBC: CPT

## 2024-02-28 PROCEDURE — 80053 COMPREHEN METABOLIC PANEL: CPT

## 2024-02-28 PROCEDURE — 84443 ASSAY THYROID STIM HORMONE: CPT

## 2024-02-28 PROCEDURE — 83036 HEMOGLOBIN GLYCOSYLATED A1C: CPT

## 2024-02-28 PROCEDURE — 36415 COLL VENOUS BLD VENIPUNCTURE: CPT

## 2024-02-28 PROCEDURE — 81001 URINALYSIS AUTO W/SCOPE: CPT

## 2024-02-28 PROCEDURE — 84402 ASSAY OF FREE TESTOSTERONE: CPT

## 2024-03-06 LAB
TESTOSTERONE FREE (CHAN): 59.7 PG/ML (ref 35–155)
TESTOSTERONE,TOTAL,LC-MS/MS: 343 NG/DL (ref 250–1100)

## 2024-03-20 DIAGNOSIS — E11.69 TYPE 2 DIABETES MELLITUS WITH OTHER SPECIFIED COMPLICATION, UNSPECIFIED WHETHER LONG TERM INSULIN USE (MULTI): ICD-10-CM

## 2024-03-20 RX ORDER — TIRZEPATIDE 7.5 MG/.5ML
INJECTION, SOLUTION SUBCUTANEOUS
Qty: 2 ML | Refills: 2 | Status: SHIPPED | OUTPATIENT
Start: 2024-03-20

## 2024-05-08 ENCOUNTER — OFFICE VISIT (OUTPATIENT)
Dept: PRIMARY CARE | Facility: CLINIC | Age: 54
End: 2024-05-08
Payer: COMMERCIAL

## 2024-05-08 VITALS
OXYGEN SATURATION: 96 % | HEART RATE: 85 BPM | SYSTOLIC BLOOD PRESSURE: 110 MMHG | BODY MASS INDEX: 40.23 KG/M2 | DIASTOLIC BLOOD PRESSURE: 64 MMHG | WEIGHT: 296.6 LBS

## 2024-05-08 DIAGNOSIS — R09.81 NASAL SINUS CONGESTION: Primary | ICD-10-CM

## 2024-05-08 DIAGNOSIS — N52.9 ERECTILE DISORDER: ICD-10-CM

## 2024-05-08 PROCEDURE — 99213 OFFICE O/P EST LOW 20 MIN: CPT | Performed by: STUDENT IN AN ORGANIZED HEALTH CARE EDUCATION/TRAINING PROGRAM

## 2024-05-08 PROCEDURE — 3044F HG A1C LEVEL LT 7.0%: CPT | Performed by: STUDENT IN AN ORGANIZED HEALTH CARE EDUCATION/TRAINING PROGRAM

## 2024-05-08 PROCEDURE — 3078F DIAST BP <80 MM HG: CPT | Performed by: STUDENT IN AN ORGANIZED HEALTH CARE EDUCATION/TRAINING PROGRAM

## 2024-05-08 PROCEDURE — 4010F ACE/ARB THERAPY RXD/TAKEN: CPT | Performed by: STUDENT IN AN ORGANIZED HEALTH CARE EDUCATION/TRAINING PROGRAM

## 2024-05-08 PROCEDURE — 1036F TOBACCO NON-USER: CPT | Performed by: STUDENT IN AN ORGANIZED HEALTH CARE EDUCATION/TRAINING PROGRAM

## 2024-05-08 PROCEDURE — 3008F BODY MASS INDEX DOCD: CPT | Performed by: STUDENT IN AN ORGANIZED HEALTH CARE EDUCATION/TRAINING PROGRAM

## 2024-05-08 PROCEDURE — 3074F SYST BP LT 130 MM HG: CPT | Performed by: STUDENT IN AN ORGANIZED HEALTH CARE EDUCATION/TRAINING PROGRAM

## 2024-05-08 RX ORDER — LEVOCETIRIZINE DIHYDROCHLORIDE 5 MG/1
5 TABLET, FILM COATED ORAL EVERY EVENING
Qty: 30 TABLET | Refills: 11 | Status: SHIPPED | OUTPATIENT
Start: 2024-05-08 | End: 2025-05-08

## 2024-05-08 RX ORDER — PREDNISONE 5 MG/1
TABLET ORAL
COMMUNITY
Start: 2024-04-28 | End: 2024-05-08 | Stop reason: ALTCHOICE

## 2024-05-08 RX ORDER — SILDENAFIL CITRATE 20 MG/1
TABLET ORAL
Qty: 50 TABLET | Refills: 11 | Status: SHIPPED | OUTPATIENT
Start: 2024-05-08

## 2024-05-08 RX ORDER — FLUTICASONE PROPIONATE 50 MCG
2 SPRAY, SUSPENSION (ML) NASAL DAILY
Qty: 16 G | Refills: 5 | Status: SHIPPED | OUTPATIENT
Start: 2024-05-08 | End: 2025-05-08

## 2024-05-08 ASSESSMENT — ENCOUNTER SYMPTOMS: DEPRESSION: 0

## 2024-05-08 NOTE — PATIENT INSTRUCTIONS
1.  For 2 months he has had severe sinus pressure congestion eustachian tube dysfunction.  Consistent with nasal allergies.  He has tried some things such as Afrin and Sudafed we advised to stay away from those products.  We would advise on daily Flonase use as directed and daily Xyzal.  If no significant symptom improvement, would advise on ENT sinus specialist referral given today.    2.  Mild eczema in the ear canals can use over-the-counter hydrocortisone can place a small amount on a Q-tip.    3.  Mild erectile symptoms refill generic Viagra

## 2024-05-08 NOTE — PROGRESS NOTES
Subjective   Patient ID: Vipul Townsend is a 53 y.o. male who presents for sinus congestion, ear blockage (Both ears), Itchy ears, and Med Refill.    HPI comes in with excessive nasal pressure congestion for 2 months ear pressure.    Review of Systems  Constitutional: Symptoms as per history of present   Eyes: no blurred vision or visual disturbance  ENT: no hearing loss, positive symptoms as per history of present illness  Cardiovascular: no chest pain, no edema, no palps and no syncope.   Respiratory: symptoms as per history of present illness  Gastrointestinal: no abdominal pain, No C/D no N/V, no blood in stools  Genitourinary: no dysuria, no change in urinary frequency, no urinary hesitancy and no feelings of urinary urgency.   Musculoskeletal: no arthralgias,  no back pain and no myalgias.   Neurological: no difficulty walking, no headache, no limb weakness, no numbness and no tingling.    Objective   /64 (BP Location: Right arm, Patient Position: Sitting, BP Cuff Size: Large adult)   Pulse 85   Wt 135 kg (296 lb 9.6 oz)   SpO2 96%   BMI 40.23 kg/m²     Physical Exam  gen- a & o x 3, positive coughing  heent- eomi, perrla, positive fluid behind TM's however non-erythematous, positive postnasal drip, positive rhinorrhea inflamed mucous membranes in the nasopharynx, positive sinus TTP  neck- positive cervical lymphadenopathy  heart- rrr, no murmurs  lungs- cta b/l , no w/r/r  chest- symmetric, nontender  ab- soft, nontender, no organomegaly, +bowel sounds  ex's- no c/c/e  neuro- CNs 2-12 grossly intact, full sensation and strength in all ex's    Assessment/Plan     1.  For 2 months he has had severe sinus pressure congestion eustachian tube dysfunction.  Consistent with nasal allergies.  He has tried some things such as Afrin and Sudafed we advised to stay away from those products.  We would advise on daily Flonase use as directed and daily Xyzal.  If no significant symptom improvement, would advise on  ENT sinus specialist referral given today.    2.  Mild eczema in the ear canals can use over-the-counter hydrocortisone can place a small amount on a Q-tip.    3.  Mild erectile symptoms refill generic Viagra

## 2024-06-17 DIAGNOSIS — E11.69 TYPE 2 DIABETES MELLITUS WITH OTHER SPECIFIED COMPLICATION, UNSPECIFIED WHETHER LONG TERM INSULIN USE (MULTI): ICD-10-CM

## 2024-06-18 RX ORDER — TIRZEPATIDE 7.5 MG/.5ML
INJECTION, SOLUTION SUBCUTANEOUS
Qty: 2 ML | Refills: 0 | Status: SHIPPED | OUTPATIENT
Start: 2024-06-18

## 2024-06-27 DIAGNOSIS — E11.69 TYPE 2 DIABETES MELLITUS WITH OTHER SPECIFIED COMPLICATION, UNSPECIFIED WHETHER LONG TERM INSULIN USE (MULTI): ICD-10-CM

## 2024-06-27 RX ORDER — METFORMIN HYDROCHLORIDE 500 MG/1
1000 TABLET, EXTENDED RELEASE ORAL 2 TIMES DAILY
Qty: 360 TABLET | Refills: 3 | Status: SHIPPED | OUTPATIENT
Start: 2024-06-27

## 2024-07-08 ENCOUNTER — OFFICE VISIT (OUTPATIENT)
Dept: OTOLARYNGOLOGY | Facility: CLINIC | Age: 54
End: 2024-07-08
Payer: COMMERCIAL

## 2024-07-08 VITALS
BODY MASS INDEX: 40.36 KG/M2 | HEIGHT: 72 IN | HEART RATE: 87 BPM | SYSTOLIC BLOOD PRESSURE: 127 MMHG | TEMPERATURE: 96.7 F | DIASTOLIC BLOOD PRESSURE: 78 MMHG | WEIGHT: 298 LBS

## 2024-07-08 DIAGNOSIS — R44.8 FACIAL PRESSURE: Primary | ICD-10-CM

## 2024-07-08 DIAGNOSIS — R06.7 SNEEZING WITH WATERY EYES: ICD-10-CM

## 2024-07-08 DIAGNOSIS — J34.89 NASAL DRAINAGE: ICD-10-CM

## 2024-07-08 DIAGNOSIS — J34.2 NASAL SEPTAL DEVIATION: ICD-10-CM

## 2024-07-08 DIAGNOSIS — R51.9 FACIAL PAIN: ICD-10-CM

## 2024-07-08 DIAGNOSIS — H04.209 SNEEZING WITH WATERY EYES: ICD-10-CM

## 2024-07-08 DIAGNOSIS — R09.81 NASAL SINUS CONGESTION: ICD-10-CM

## 2024-07-08 PROCEDURE — 31231 NASAL ENDOSCOPY DX: CPT | Performed by: NURSE PRACTITIONER

## 2024-07-08 PROCEDURE — 3078F DIAST BP <80 MM HG: CPT | Performed by: NURSE PRACTITIONER

## 2024-07-08 PROCEDURE — 3074F SYST BP LT 130 MM HG: CPT | Performed by: NURSE PRACTITIONER

## 2024-07-08 PROCEDURE — 3044F HG A1C LEVEL LT 7.0%: CPT | Performed by: NURSE PRACTITIONER

## 2024-07-08 PROCEDURE — 99214 OFFICE O/P EST MOD 30 MIN: CPT | Performed by: NURSE PRACTITIONER

## 2024-07-08 PROCEDURE — 4010F ACE/ARB THERAPY RXD/TAKEN: CPT | Performed by: NURSE PRACTITIONER

## 2024-07-08 PROCEDURE — 1036F TOBACCO NON-USER: CPT | Performed by: NURSE PRACTITIONER

## 2024-07-08 PROCEDURE — 99204 OFFICE O/P NEW MOD 45 MIN: CPT | Performed by: NURSE PRACTITIONER

## 2024-07-08 RX ORDER — AZELASTINE 1 MG/ML
SPRAY, METERED NASAL
Qty: 30 ML | Refills: 1 | Status: SHIPPED | OUTPATIENT
Start: 2024-07-08

## 2024-07-08 SDOH — ECONOMIC STABILITY: FOOD INSECURITY: WITHIN THE PAST 12 MONTHS, THE FOOD YOU BOUGHT JUST DIDN'T LAST AND YOU DIDN'T HAVE MONEY TO GET MORE.: NEVER TRUE

## 2024-07-08 SDOH — ECONOMIC STABILITY: FOOD INSECURITY: WITHIN THE PAST 12 MONTHS, YOU WORRIED THAT YOUR FOOD WOULD RUN OUT BEFORE YOU GOT MONEY TO BUY MORE.: NEVER TRUE

## 2024-07-08 ASSESSMENT — COLUMBIA-SUICIDE SEVERITY RATING SCALE - C-SSRS
1. IN THE PAST MONTH, HAVE YOU WISHED YOU WERE DEAD OR WISHED YOU COULD GO TO SLEEP AND NOT WAKE UP?: NO
2. HAVE YOU ACTUALLY HAD ANY THOUGHTS OF KILLING YOURSELF?: NO
6. HAVE YOU EVER DONE ANYTHING, STARTED TO DO ANYTHING, OR PREPARED TO DO ANYTHING TO END YOUR LIFE?: NO

## 2024-07-08 ASSESSMENT — LIFESTYLE VARIABLES
HOW OFTEN DO YOU HAVE A DRINK CONTAINING ALCOHOL: 2-4 TIMES A MONTH
AUDIT-C TOTAL SCORE: 2
HOW OFTEN DO YOU HAVE SIX OR MORE DRINKS ON ONE OCCASION: NEVER
SKIP TO QUESTIONS 9-10: 1
HOW MANY STANDARD DRINKS CONTAINING ALCOHOL DO YOU HAVE ON A TYPICAL DAY: 1 OR 2

## 2024-07-08 ASSESSMENT — ENCOUNTER SYMPTOMS
DEPRESSION: 0
OCCASIONAL FEELINGS OF UNSTEADINESS: 0
LOSS OF SENSATION IN FEET: 0

## 2024-07-08 ASSESSMENT — PATIENT HEALTH QUESTIONNAIRE - PHQ9
1. LITTLE INTEREST OR PLEASURE IN DOING THINGS: NOT AT ALL
SUM OF ALL RESPONSES TO PHQ9 QUESTIONS 1 AND 2: 0
2. FEELING DOWN, DEPRESSED OR HOPELESS: NOT AT ALL

## 2024-07-08 ASSESSMENT — PAIN SCALES - GENERAL: PAINLEVEL: 0-NO PAIN

## 2024-07-08 NOTE — PATIENT INSTRUCTIONS
- Start sinus rinses daily.  - Continue Flonase and Zyrtec.  - Add Azelastine.  - Obtain allergy testing.  - Follow up in 6 weeks with a hearing test before.    Welcome to Anamika Mohsen's clinic. We are here to assist you through your ENT care at [facility].  Anamika is a Nurse Practitioner who specializes in General ENT. This means that she specializes in taking care of patients with usual ENT issues such as nasal congestion, allergy symptoms, sinusitis, hearing loss, ear infections, ear wax removal, hoarseness, sore throat, throat infections, reflux and some swallowing issues. She also sees patients regarding dizziness and vertigo.   Emily is Anamika's  and she answers the office phone from 7:30am-4pm Mon-Fri. Call [phone]. She can help you with scheduling of appointments, and general questions and information. You may need to leave a message if she is helping another patient. In this case, someone from the team will call you back the same day if you leave your message before 3pm, or the next business morning.  Anamika currently sees patients at [facility] on Mondays, Wednesdays and Thursdays.  She works closely with audiologists to solve issues with hearing. She is also in very close contact with her collaborative physicians. Dr. Gavin, a surgeon who specializes in general ENT and rhinology. She also works closely with otologist (ear surgeon) Dr. Banegas and head and neck surgery Dr. Hernandes.   Others who may be included in your care are dieticians, social workers, allergists, gastroenterologists, neurologists, and physical therapists. Anamika will provide these referrals as needed. Please let her know if you would like to request a specific referral.  Anamika makes every effort to run on time for your appointments. Therefore, if you are more than 15 minutes late unrelated to a scan or another appointment such as therapy or audiology, your appointment will need  to be rescheduled for another day. We appreciate your understanding.   We look forward to working with you to meet your healthcare goals.

## 2024-07-08 NOTE — PROGRESS NOTES
Subjective   Patient ID: Vipul Townsend is a 53 y.o. male who presents for Sinus Problem.    HPI  Patient here for his sinuses. Has had issues for years. He broke his nose multiple times in high school and college playing sports.   He has constant facial pressure above/around the eyes and in the cheeks. He gets clear nasal drainage bilaterally. He has trouble breathing through his nose bilaterally. No trouble smelling.  No allergy testing. Takes Zyrtec daily. Gets itchy watery eyes/sneezing. Symptoms are consistent all year.   No recent antibiotics.  Has tried Neti Pot in the past, no improvement. Takes Flonase daily for the last 2 months with no improvement. Prior to seeing his PCP in May, he was using Afrin nasal spray and sinus medication daily for years.   No recent imaging.   Former smoker.  He had a sinus procedure done by Dr. Barrientos ~ 20 years ago. Not sure details, but it didn't help.    His ears feel clogged, popping his ears helps for a second. His ears itchy badly. A lot of ear pressure. Does get some ear pain. No ear surgery. Had bad ear infections as a child.     Patient Active Problem List   Diagnosis    Atrial flutter with rapid ventricular response (Multi)    Coronary artery disease    DDD (degenerative disc disease), lumbosacral    Erectile dysfunction    GERD (gastroesophageal reflux disease)    HTN (hypertension)    Hyperlipemia    Hypogonadism, male    Morbidly obese (Multi)    Diabetes mellitus (Multi)    ABHIJIT (obstructive sleep apnea)    Paroxysmal atrial fibrillation with RVR (Multi)    Presence of stent in coronary artery    SPL (spondylolisthesis)    Tear of medial meniscus of knee    Vitamin B12 deficiency    Vitamin D insufficiency    BMI 45.0-49.9, adult (Multi)     Past Surgical History:   Procedure Laterality Date    BACK SURGERY  10/24/2018    Back Surgery    KNEE SURGERY  10/24/2018    Knee Surgery    OTHER SURGICAL HISTORY  03/31/2020    Cardiac catheterization with stent placement    OTHER  SURGICAL HISTORY  03/31/2020    Cardiac catheterization    OTHER SURGICAL HISTORY  04/02/2020    Percutaneous transluminal coronary angioplasty     Review of Systems    All other systems have been reviewed and are negative for complaints except for those mentioned in history of present illness, past medical history and problem list.    Objective   Physical Exam    Constitutional: No fever, chills, weight loss or weight gain  General appearance: Appears well, well-nourished, well groomed. No acute distress.    Communication: Normal communication    Psychiatric: Oriented to person, place and time. Normal mood and affect.    Neurologic: Cranial nerves II-XII grossly intact and symmetric bilaterally.    Head and Face:  Head: Atraumatic with no masses, lesions or scarring.  Face: Normal symmetry. No scars or deformities.  TMJ: Normal, no trismus.    Eyes: Conjunctiva not edematous or erythematous.     Right Ear: External inspection of ear with no deformity, scars, or masses. EAC is clear.  TM is intact with no sign of infection, effusion, or retraction.  No perforation seen.     Left Ear: External inspection of ear with no deformity, scars, or masses. EAC is clear.  TM is intact with no sign of infection, effusion, or retraction.  No perforation seen.     Nose: External inspection of nose: No nasal lesions, lacerations or scars. Septum deviated to the right. Anterior rhinoscopy with limited visualization past the inferior turbinates. No tenderness on frontal or maxillary sinus palpation.    Oral Cavity/Mouth: Oral cavity and oropharynx mucosa moist and pink. No lesions or masses. Dentition normal. Tonsils appear surgically removed. Uvula is midline. Tongue with no masses or lesions. Tongue with good mobility. The oropharynx is clear.    Neck: Normal appearing, symmetric, trachea midline.     Cardiovascular: Examination of peripheral vascular system shows no clubbing or cyanosis.    Respiratory: No respiratory distress  increased work of breathing. Inspection of the chest with symmetric chest expansion and normal respiratory effort.    Skin: No head and neck rashes.    Lymph nodes: No adenopathy.     Procedure: Flexible Nasal Endoscopy  Indication: To visualize past the inferior turbinates  Risks, benefits, alternatives, and expectations discussed with patient and he wishes to proceed.    Findings: After topical decongestion with decongestant and anesthetic spray, nasal endoscopy was performed using an endoscope. The septum was deviated to the right, very  narrow on that side. There is a small perforation more medially at the left nasal septum.   Left: The inferior turbinate was healthy. The middle turbinate appeared healthy. The middle meatus is free of purulence, masses, lesions or polyps.    Right: The inferior turbinate was healthy. The middle turbinate appeared healthy. The middle meatus is free of purulence, masses, lesions or polyps.      The nasal passageway was patent. The nasopharynx was clear. There were no complications and the patient tolerated the procedure well.     Reviewed PCP note from 5/8/2024.    Assessment/Plan   Diagnoses and all orders for this visit:  Nasal endoscopy performed.  Facial pressure  Nasal sinus congestion  Facial pain  Nasal drainage  Sneezing with watery eyes  -     azelastine (Astelin) 137 mcg (0.1 %) nasal spray; Administer 2 sprays into each nostril, twice daily.  - Continue sinus rinses daily.  - Continue Flonase nasal spray.  -     Respiratory Allergy Profile IgE; Future    Discussed continuing this nasal regimen daily for the next 6 weeks.  We will follow up at that time. Will review allergy profile.  Will consider referral to allergy vs imaging if symptoms not improving.     All questions answered to patient satisfaction.        NANCY Bullock-CNP 07/08/24 8:03 AM

## 2024-07-22 DIAGNOSIS — E11.69 TYPE 2 DIABETES MELLITUS WITH OTHER SPECIFIED COMPLICATION, UNSPECIFIED WHETHER LONG TERM INSULIN USE (MULTI): ICD-10-CM

## 2024-07-22 PROBLEM — H69.90 DYSFUNCTION OF EUSTACHIAN TUBE: Status: ACTIVE | Noted: 2024-07-22

## 2024-07-22 PROBLEM — R94.39 ABNORMAL THALLIUM STRESS TEST: Status: ACTIVE | Noted: 2024-07-22

## 2024-07-22 PROBLEM — N20.1 CALCULUS OF URETER: Status: ACTIVE | Noted: 2024-07-22

## 2024-07-22 PROBLEM — I50.22 CHRONIC SYSTOLIC HEART FAILURE (MULTI): Status: ACTIVE | Noted: 2024-07-22

## 2024-07-22 PROBLEM — Z86.39 HISTORY OF OBESITY: Status: ACTIVE | Noted: 2024-07-22

## 2024-07-22 PROBLEM — R07.89 CHEST DISCOMFORT: Status: ACTIVE | Noted: 2024-07-22

## 2024-07-22 PROBLEM — J31.0 CHRONIC RHINITIS: Status: ACTIVE | Noted: 2024-07-22

## 2024-07-22 PROBLEM — Z98.61 HISTORY OF PERCUTANEOUS CORONARY INTERVENTION: Status: ACTIVE | Noted: 2024-07-22

## 2024-07-22 PROBLEM — Z98.61 STATUS POST PERCUTANEOUS TRANSLUMINAL CORONARY ANGIOPLASTY: Status: ACTIVE | Noted: 2024-07-22

## 2024-07-22 RX ORDER — TIRZEPATIDE 7.5 MG/.5ML
INJECTION, SOLUTION SUBCUTANEOUS
Qty: 2 ML | Refills: 0 | Status: SHIPPED | OUTPATIENT
Start: 2024-07-22

## 2024-08-20 DIAGNOSIS — E11.69 TYPE 2 DIABETES MELLITUS WITH OTHER SPECIFIED COMPLICATION, UNSPECIFIED WHETHER LONG TERM INSULIN USE (MULTI): ICD-10-CM

## 2024-08-22 ENCOUNTER — CLINICAL SUPPORT (OUTPATIENT)
Dept: AUDIOLOGY | Facility: CLINIC | Age: 54
End: 2024-08-22
Payer: COMMERCIAL

## 2024-08-22 ENCOUNTER — OFFICE VISIT (OUTPATIENT)
Dept: OTOLARYNGOLOGY | Facility: CLINIC | Age: 54
End: 2024-08-22
Payer: COMMERCIAL

## 2024-08-22 VITALS — TEMPERATURE: 97.3 F | WEIGHT: 298 LBS | HEIGHT: 72 IN | BODY MASS INDEX: 40.36 KG/M2

## 2024-08-22 DIAGNOSIS — J34.2 NASAL SEPTAL DEVIATION: ICD-10-CM

## 2024-08-22 DIAGNOSIS — R09.81 NASAL SINUS CONGESTION: ICD-10-CM

## 2024-08-22 DIAGNOSIS — R06.7 SNEEZING WITH WATERY EYES: Primary | ICD-10-CM

## 2024-08-22 DIAGNOSIS — R44.8 FACIAL PRESSURE: ICD-10-CM

## 2024-08-22 DIAGNOSIS — H91.93 HIGH FREQUENCY HEARING LOSS, BILATERAL: ICD-10-CM

## 2024-08-22 DIAGNOSIS — J34.89 NASAL DRAINAGE: ICD-10-CM

## 2024-08-22 DIAGNOSIS — R51.9 FACIAL PAIN: ICD-10-CM

## 2024-08-22 DIAGNOSIS — H04.209 SNEEZING WITH WATERY EYES: Primary | ICD-10-CM

## 2024-08-22 DIAGNOSIS — H93.13 TINNITUS, BILATERAL: Primary | ICD-10-CM

## 2024-08-22 PROCEDURE — 92550 TYMPANOMETRY & REFLEX THRESH: CPT | Performed by: AUDIOLOGIST

## 2024-08-22 PROCEDURE — 99213 OFFICE O/P EST LOW 20 MIN: CPT | Performed by: NURSE PRACTITIONER

## 2024-08-22 PROCEDURE — 92557 COMPREHENSIVE HEARING TEST: CPT | Performed by: AUDIOLOGIST

## 2024-08-22 PROCEDURE — 1036F TOBACCO NON-USER: CPT | Performed by: NURSE PRACTITIONER

## 2024-08-22 PROCEDURE — 4010F ACE/ARB THERAPY RXD/TAKEN: CPT | Performed by: NURSE PRACTITIONER

## 2024-08-22 PROCEDURE — 3044F HG A1C LEVEL LT 7.0%: CPT | Performed by: NURSE PRACTITIONER

## 2024-08-22 PROCEDURE — 3008F BODY MASS INDEX DOCD: CPT | Performed by: NURSE PRACTITIONER

## 2024-08-22 ASSESSMENT — ENCOUNTER SYMPTOMS
DEPRESSION: 0
OCCASIONAL FEELINGS OF UNSTEADINESS: 0
LOSS OF SENSATION IN FEET: 0

## 2024-08-22 ASSESSMENT — PAIN SCALES - GENERAL: PAINLEVEL: 0-NO PAIN

## 2024-08-22 NOTE — PROGRESS NOTES
"AUDIOLOGY ADULT AUDIOMETRIC EVALUATION      Name:  Vipul Townsend  :  1970  Age:  53 y.o.  Date of Evaluation:  2024    HISTORY  Reason for visit:  hearing loss  Mr. Townsend is seen 2024 at the request of Anamika Connolly CNP  for an evaluation of hearing.      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    RESULTS   Otoscopic Evaluation: clear canals bilaterally       Immittance Measures (226 Hz probe tone):   Tympanometry is consistent with normal middle ear pressure and normal tympanic membrane mobility bilaterally.       Ipsilateral acoustic reflexes (500-4000 Hz) are absent for the right ear and present for the left ear for 500-2000 Hz (absent 4000 Hz).      Test technique:  standard behavioral technique via T[facility] earphones.  Reliability is good.    Pure Tone Audiometry:  Hearing sensitivity is in the normal hearing to mild hearing loss range bilaterally.       Speech Audiometry:        Right Ear:  Speech Reception Threshold (SRT) was obtained at 15 dBHL                 Speech discrimination score was 100% in quiet when words were presented at 65 dBHL (10 item NU-6 ordered-by-difficulty list).        Left Ear:  Speech Reception Threshold (SRT) was obtained at 15 dBHL                 Speech discrimination score was 100% in quiet when words were presented at 65 dBHL (10 item NU-6 ordered-by-difficulty list).      IMPRESSIONS:  Patient is " expected to have communication difficulty in adverse listening environments.    Patient is expected to benefit from effective communication strategies and may benefit from devices that provide amplification and/or improve the desired sound signal over that of background noise.       RECOMMENDATIONS  Continue with medical follow-up with Anamika Connolly CNP.  Reassess hearing in 1 year (or sooner if medically indicated or if there is a concern for a change in hearing).    Continue with medical follow-up as indicated.      PATIENT EDUCATION  Discussed results and recommendations with patient.  Questions were addressed and the patient was encouraged to contact our department should concerns arise.       LISBETH Patel, CCC-A  Licensed Audiologist

## 2024-08-22 NOTE — PATIENT INSTRUCTIONS
Referral to allergy placed.     [facility] [phone]    Supriya Isabel Tuleta [phone]      Welcome to Anamika Connolly's clinic. We are here to assist you through your ENT care at [facility].  Anamika is a Nurse Practitioner who specializes in General ENT. This means that she specializes in taking care of patients with usual ENT issues such as nasal congestion, allergy symptoms, sinusitis, hearing loss, ear infections, ear wax removal, hoarseness, sore throat, throat infections, reflux and some swallowing issues. She also sees patients regarding dizziness and vertigo.   Emily is Anamika's  and she answers the office phone from 7:30am-4pm Mon-Fri. Call [phone]. She can help you with scheduling of appointments, and general questions and information. You may need to leave a message if she is helping another patient. In this case, someone from the team will call you back the same day if you leave your message before 3pm, or the next business morning.  Anamika currently sees patients at [facility] on Mondays, Wednesdays and Thursdays.  She works closely with audiologists to solve issues with hearing. She is also in very close contact with her collaborative physicians. Dr. Gavin, a surgeon who specializes in general ENT and rhinology. She also works closely with otologist (ear surgeon) Dr. Banegas and head and neck surgery Dr. Hernandes.   Others who may be included in your care are dieticians, social workers, allergists, gastroenterologists, neurologists, and physical therapists. Anamika will provide these referrals as needed. Please let her know if you would like to request a specific referral.  Anamika makes every effort to run on time for your appointments. Therefore, if you are more than 15 minutes late unrelated to a scan or another appointment such as therapy or audiology, your appointment will need to be rescheduled for another day. We appreciate your  understanding.   We look forward to working with you to meet your healthcare goals.

## 2024-08-22 NOTE — PROGRESS NOTES
Subjective   Patient ID: Vipul Townsend is a 53 y.o. male who presents for No chief complaint on file..    HPI  INITIAL VISIT 7/8/2024:  Patient here for his sinuses. Has had issues for years. He broke his nose multiple times in high school and college playing sports.   He has constant facial pressure above/around the eyes and in the cheeks. He gets clear nasal drainage bilaterally. He has trouble breathing through his nose bilaterally. No trouble smelling.  No allergy testing. Takes Zyrtec daily. Gets itchy watery eyes/sneezing. Symptoms are consistent all year.   No recent antibiotics.  Has tried Neti Pot in the past, no improvement. Takes Flonase daily for the last 2 months with no improvement. Prior to seeing his PCP in May, he was using Afrin nasal spray and sinus medication daily for years.   No recent imaging.   Former smoker.  He had a sinus procedure done by Dr. Barrientos ~ 20 years ago. Not sure details, but it didn't help.    His ears feel clogged, popping his ears helps for a second. His ears itchy badly. A lot of ear pressure. Does get some ear pain. No ear surgery. Had bad ear infections as a child.     8/22/2024:  Patient following up for sinonasal complaints.  Tried to use the sinus rinse but couldn't.  It caused him a lot of discomfort in his ears.  He uses the Flonase and Azelastine with no improvement. Didn't get the allergy testing.   He is still getting sinus pressure, cheeks and can affect his neck. Does get nasal congestion and drainage.  He is not interested in getting a CAT scan at this time.    Patient Active Problem List   Diagnosis    Atrial flutter with rapid ventricular response (Multi)    Coronary artery disease    DDD (degenerative disc disease), lumbosacral    Erectile dysfunction    GERD (gastroesophageal reflux disease)    HTN (hypertension)    Hyperlipemia    Hypogonadism, male    Morbidly obese (Multi)    Diabetes mellitus (Multi)    ABHIJIT (obstructive sleep apnea)    Paroxysmal atrial  fibrillation with RVR (Multi)    Presence of stent in coronary artery    SPL (spondylolisthesis)    Tear of medial meniscus of knee    Vitamin B12 deficiency    Vitamin D insufficiency    BMI 45.0-49.9, adult (Multi)    Abnormal thallium stress test    Calculus of ureter    Chest discomfort    Chronic rhinitis    Chronic systolic heart failure (Multi)    Dysfunction of eustachian tube    History of obesity    History of percutaneous coronary intervention    Status post percutaneous transluminal coronary angioplasty     Past Surgical History:   Procedure Laterality Date    BACK SURGERY  10/24/2018    Back Surgery    KNEE SURGERY  10/24/2018    Knee Surgery    OTHER SURGICAL HISTORY  03/31/2020    Cardiac catheterization with stent placement    OTHER SURGICAL HISTORY  03/31/2020    Cardiac catheterization    OTHER SURGICAL HISTORY  04/02/2020    Percutaneous transluminal coronary angioplasty     Review of Systems    All other systems have been reviewed and are negative for complaints except for those mentioned in history of present illness, past medical history and problem list.    Objective   Physical Exam    Constitutional: No fever, chills, weight loss or weight gain  General appearance: Appears well, well-nourished, well groomed. No acute distress.    Communication: Normal communication    Psychiatric: Oriented to person, place and time. Normal mood and affect.    Neurologic: Cranial nerves II-XII grossly intact and symmetric bilaterally.    Head and Face:  Head: Atraumatic with no masses, lesions or scarring.  Face: Normal symmetry. No scars or deformities.  TMJ: Normal, no trismus.    Eyes: Conjunctiva not edematous or erythematous.     Right Ear: External inspection of ear with no deformity, scars, or masses. EAC is clear.  TM is intact with no sign of infection, effusion, or retraction.  No perforation seen.     Left Ear: External inspection of ear with no deformity, scars, or masses. EAC is clear.  TM is  intact with no sign of infection, effusion, or retraction.  No perforation seen.     Nose: External inspection of nose: No nasal lesions, lacerations or scars. Septum deviated to the right. Anterior rhinoscopy with limited visualization past the inferior turbinates. No tenderness on frontal or maxillary sinus palpation.    Oral Cavity/Mouth: Oral cavity and oropharynx mucosa moist and pink. No lesions or masses. Dentition normal. Tonsils appear surgically removed. Uvula is midline. Tongue with no masses or lesions. Tongue with good mobility. The oropharynx is clear.    Neck: Normal appearing, symmetric, trachea midline.     Cardiovascular: Examination of peripheral vascular system shows no clubbing or cyanosis.    Respiratory: No respiratory distress increased work of breathing. Inspection of the chest with symmetric chest expansion and normal respiratory effort.    Skin: No head and neck rashes.    Lymph nodes: No adenopathy.     Diagnostic Results   I personally interpreted audiogram from which shows normal hearing bilaterally.  Type A tympanograms.  Excellent word recognition scores of 100% at 65 dB.    Assessment/Plan   Diagnoses and all orders for this visit:  Nasal endoscopy performed.  Facial pressure  Nasal sinus congestion  Facial pain  Nasal drainage  Sneezing with watery eyes  We discussed options in detail which include continuing medical therapy versus referral to allergy versus imaging.  Patient feels that the nasal sprays and rinses are not helping and he does not wish to obtain a CT scan at this time.  We will refer to allergy and I will follow-up with him after he gets this done to discuss next steps or other options.    All questions answered to patient satisfaction.        RUFINA Bullock 08/22/24 4:04 PM

## 2024-08-23 RX ORDER — TIRZEPATIDE 7.5 MG/.5ML
INJECTION, SOLUTION SUBCUTANEOUS
Qty: 2 ML | Refills: 0 | Status: SHIPPED | OUTPATIENT
Start: 2024-08-23

## 2024-09-16 DIAGNOSIS — E11.69 TYPE 2 DIABETES MELLITUS WITH OTHER SPECIFIED COMPLICATION, UNSPECIFIED WHETHER LONG TERM INSULIN USE (MULTI): ICD-10-CM

## 2024-09-16 RX ORDER — TIRZEPATIDE 7.5 MG/.5ML
INJECTION, SOLUTION SUBCUTANEOUS
Qty: 2 ML | Refills: 0 | Status: SHIPPED | OUTPATIENT
Start: 2024-09-16

## 2024-10-13 DIAGNOSIS — E11.69 TYPE 2 DIABETES MELLITUS WITH OTHER SPECIFIED COMPLICATION, UNSPECIFIED WHETHER LONG TERM INSULIN USE (MULTI): ICD-10-CM

## 2024-10-14 RX ORDER — TIRZEPATIDE 7.5 MG/.5ML
INJECTION, SOLUTION SUBCUTANEOUS
Qty: 2 ML | Refills: 0 | Status: SHIPPED | OUTPATIENT
Start: 2024-10-14

## 2024-10-18 PROBLEM — E66.813 CLASS 3 SEVERE OBESITY WITH BODY MASS INDEX (BMI) OF 40.0 TO 44.9 IN ADULT: Status: ACTIVE | Noted: 2023-10-17

## 2024-10-18 PROBLEM — E66.01 CLASS 3 SEVERE OBESITY WITH BODY MASS INDEX (BMI) OF 40.0 TO 44.9 IN ADULT: Status: ACTIVE | Noted: 2023-10-17

## 2024-10-18 PROBLEM — E66.01 MORBIDLY OBESE (MULTI): Status: RESOLVED | Noted: 2023-05-13 | Resolved: 2024-10-18

## 2024-10-18 PROBLEM — Z86.39 HISTORY OF OBESITY: Status: RESOLVED | Noted: 2024-07-22 | Resolved: 2024-10-18

## 2024-11-07 ENCOUNTER — APPOINTMENT (OUTPATIENT)
Dept: CARDIOLOGY | Facility: CLINIC | Age: 54
End: 2024-11-07
Payer: COMMERCIAL

## 2024-11-07 ENCOUNTER — HOSPITAL ENCOUNTER (OUTPATIENT)
Dept: CARDIOLOGY | Facility: CLINIC | Age: 54
Discharge: HOME | End: 2024-11-07
Payer: COMMERCIAL

## 2024-11-07 VITALS
DIASTOLIC BLOOD PRESSURE: 78 MMHG | WEIGHT: 301 LBS | BODY MASS INDEX: 40.77 KG/M2 | OXYGEN SATURATION: 98 % | HEART RATE: 76 BPM | HEIGHT: 72 IN | SYSTOLIC BLOOD PRESSURE: 126 MMHG

## 2024-11-07 DIAGNOSIS — I25.10 CORONARY ARTERY DISEASE INVOLVING NATIVE CORONARY ARTERY OF NATIVE HEART WITHOUT ANGINA PECTORIS: ICD-10-CM

## 2024-11-07 DIAGNOSIS — I48.0 PAROXYSMAL ATRIAL FIBRILLATION WITH RVR (MULTI): ICD-10-CM

## 2024-11-07 DIAGNOSIS — I10 PRIMARY HYPERTENSION: ICD-10-CM

## 2024-11-07 DIAGNOSIS — I48.92 ATRIAL FLUTTER WITH RAPID VENTRICULAR RESPONSE (MULTI): ICD-10-CM

## 2024-11-07 DIAGNOSIS — Z95.5 PRESENCE OF STENT IN CORONARY ARTERY: ICD-10-CM

## 2024-11-07 DIAGNOSIS — E78.5 HYPERLIPIDEMIA, UNSPECIFIED HYPERLIPIDEMIA TYPE: ICD-10-CM

## 2024-11-07 DIAGNOSIS — Z98.61 STATUS POST PERCUTANEOUS TRANSLUMINAL CORONARY ANGIOPLASTY: Primary | ICD-10-CM

## 2024-11-07 PROBLEM — I50.22 CHRONIC SYSTOLIC HEART FAILURE: Status: RESOLVED | Noted: 2024-07-22 | Resolved: 2024-11-07

## 2024-11-07 PROBLEM — R07.89 CHEST DISCOMFORT: Status: RESOLVED | Noted: 2024-07-22 | Resolved: 2024-11-07

## 2024-11-07 PROCEDURE — C8929 TTE W OR WO FOL WCON,DOPPLER: HCPCS

## 2024-11-07 PROCEDURE — 99213 OFFICE O/P EST LOW 20 MIN: CPT | Performed by: INTERNAL MEDICINE

## 2024-11-07 PROCEDURE — 2500000004 HC RX 250 GENERAL PHARMACY W/ HCPCS (ALT 636 FOR OP/ED): Performed by: INTERNAL MEDICINE

## 2024-11-07 PROCEDURE — 3078F DIAST BP <80 MM HG: CPT | Performed by: INTERNAL MEDICINE

## 2024-11-07 PROCEDURE — 4010F ACE/ARB THERAPY RXD/TAKEN: CPT | Performed by: INTERNAL MEDICINE

## 2024-11-07 PROCEDURE — 93306 TTE W/DOPPLER COMPLETE: CPT | Performed by: INTERNAL MEDICINE

## 2024-11-07 PROCEDURE — 3074F SYST BP LT 130 MM HG: CPT | Performed by: INTERNAL MEDICINE

## 2024-11-07 PROCEDURE — 3044F HG A1C LEVEL LT 7.0%: CPT | Performed by: INTERNAL MEDICINE

## 2024-11-07 PROCEDURE — 3008F BODY MASS INDEX DOCD: CPT | Performed by: INTERNAL MEDICINE

## 2024-11-07 PROCEDURE — 1036F TOBACCO NON-USER: CPT | Performed by: INTERNAL MEDICINE

## 2024-11-07 NOTE — PROGRESS NOTES
Subjective   Vipul Townsend is a 54 y.o. male.    Chief Complaint:  Follow-up coronary artery disease, atrial fibrillation.    HPI    Since his last visit he has done well.  He did have about an 80 pound weight loss and then has hit a certain level.  He felt much better after his weight loss.  He has not had any palpitations.  No increased dyspnea with exertion from baseline.  No anginal symptoms.    Cardiac catheterization in December 2019 which demonstrated mild coronary artery disease and patent stents in the left anterior descending coronary artery.     In 2020 the patient underwent a pulmonary vein isolation procedure. Subsequent to that he has been able to maintain sinus rhythm.     A stress thallium study in early December 2019 showed a left ventricular ejection fraction of 38%. An echocardiographic study performed on June 2, 2020 showed normal left ventricular systolic function with no significant valvular disease.     Cardiac catheterization in 2012 showed a /99 percent left anterior descending coronary artery treated with a drug-eluting stent.     He has obstructive sleep apnea and uses a cpap mask.     Allergies  Medication    · Trulicity SOPN   Recorded By: Liu Tapia; 8/31/2015 2:53:32 PM     Family History  Mother    · Family history of diabetes mellitus (V18.0) (Z83.3)  Father    · Family history of myocardial infarction (V17.3) (Z82.49)   · Family history of hypertension (V17.49) (Z82.49)     Social History  Problems    · Former smoker (V15.82) (Z87.891)   · quit 2009   · History of marijuana use (305.23) (F12.91)   · quit 2015   ·    · Moderate alcohol use   · Nonsmoker (V49.89) (Z78.9)     Review of Systems   Constitutional: Positive for weight loss.   Cardiovascular:  Positive for dyspnea on exertion.   Musculoskeletal:  Positive for arthritis.   All other systems reviewed and are negative.    Current Outpatient Medications   Medication Sig Dispense Refill    atorvastatin  (Lipitor) 20 mg tablet Take 1 tablet (20 mg) by mouth once daily. 90 tablet 3    azelastine (Astelin) 137 mcg (0.1 %) nasal spray Administer 2 sprays into each nostril, twice daily. 30 mL 1    blood sugar diagnostic (GenStrip Test Strip) strip Test once daily as directed 100 strip 3    blood-glucose meter misc Test glucose as directed 1 each 0    fluticasone (Flonase) 50 mcg/actuation nasal spray Administer 2 sprays into each nostril once daily. Shake gently. Before first use, prime pump. After use, clean tip and replace cap. 16 g 5    lancets misc Test once daily as directed 100 each 3    losartan (Cozaar) 100 mg tablet Take 1 tablet (100 mg) by mouth once daily. 90 tablet 3    metFORMIN  mg 24 hr tablet TAKE TWO TABLETS BY MOUTH TWO TIMES A  tablet 3    metoprolol tartrate (Lopressor) 100 mg tablet Take 1 tablet (100 mg) by mouth 2 times a day. 180 tablet 3    Mounjaro 7.5 mg/0.5 mL pen injector INJECT 7.5 MG SUBCUTANEOUSLY ONCE WEEKLY 2 mL 0     No current facility-administered medications for this visit.        Visit Vitals  /78 (BP Location: Right arm)   Pulse 76   Ht 1.829 m (6')   Wt 137 kg (301 lb)   SpO2 98%   BMI 40.82 kg/m²   Smoking Status Former   BSA 2.64 m²        Objective     Constitutional:       Appearance: Not in distress.   Neck:      Vascular: JVD normal.   Pulmonary:      Breath sounds: Normal breath sounds.   Cardiovascular:      Normal rate. Regular rhythm. S1 with normal intensity. S2 with normal intensity.       Murmurs: There is no murmur.      No gallop.    Pulses:     Intact distal pulses.   Edema:     Peripheral edema absent.   Abdominal:      General: Bowel sounds are normal.   Neurological:      Mental Status: Alert and oriented to person, place and time.         Lab Review:   Lab Results   Component Value Date     02/28/2024    K 4.2 02/28/2024     02/28/2024    CO2 27 02/28/2024    BUN 12 02/28/2024    CREATININE 0.73 02/28/2024    GLUCOSE 156 (H)  02/28/2024    CALCIUM 9.7 02/28/2024       Assessment:    1.  Paroxysmal atrial fibrillation.  Status post pulmonary vein isolation procedure.  This was done 4 years ago.  Continues to maintain sinus rhythm.    2.  Coronary artery disease.  Mild coronary artery disease by cardiac catheterization in 2019.  No anginal symptoms.    3.  Hypertension.  Blood pressures are normal.    4.  Hyperlipidemia.  Latest LDL is 28.  Will need follow-up labs in the future.    Have referred to Dr. Li for primary care.

## 2024-11-08 LAB
AORTIC VALVE MEAN GRADIENT: 3 MMHG
AORTIC VALVE PEAK VELOCITY: 1.16 M/S
AV PEAK GRADIENT: 5 MMHG
EJECTION FRACTION APICAL 4 CHAMBER: 64.5
EJECTION FRACTION: 60 %
LEFT ATRIUM VOLUME AREA LENGTH INDEX BSA: 26 ML/M2
LEFT VENTRICLE INTERNAL DIMENSION DIASTOLE: 5.4 CM (ref 3.5–6)
LEFT VENTRICULAR OUTFLOW TRACT DIAMETER: 2.61 CM
RIGHT VENTRICLE FREE WALL PEAK S': 0.1 CM/S
TRICUSPID ANNULAR PLANE SYSTOLIC EXCURSION: 2.4 CM

## 2024-11-16 DIAGNOSIS — E11.69 TYPE 2 DIABETES MELLITUS WITH OTHER SPECIFIED COMPLICATION, UNSPECIFIED WHETHER LONG TERM INSULIN USE (MULTI): ICD-10-CM

## 2024-11-18 RX ORDER — TIRZEPATIDE 7.5 MG/.5ML
INJECTION, SOLUTION SUBCUTANEOUS
Qty: 2 ML | Refills: 0 | Status: SHIPPED | OUTPATIENT
Start: 2024-11-18

## 2024-12-13 DIAGNOSIS — E11.69 TYPE 2 DIABETES MELLITUS WITH OTHER SPECIFIED COMPLICATION, UNSPECIFIED WHETHER LONG TERM INSULIN USE (MULTI): ICD-10-CM

## 2024-12-13 RX ORDER — TIRZEPATIDE 7.5 MG/.5ML
INJECTION, SOLUTION SUBCUTANEOUS
Qty: 2 ML | Refills: 0 | Status: SHIPPED | OUTPATIENT
Start: 2024-12-13

## 2025-01-12 DIAGNOSIS — E11.69 TYPE 2 DIABETES MELLITUS WITH OTHER SPECIFIED COMPLICATION, UNSPECIFIED WHETHER LONG TERM INSULIN USE (MULTI): ICD-10-CM

## 2025-01-13 RX ORDER — TIRZEPATIDE 7.5 MG/.5ML
INJECTION, SOLUTION SUBCUTANEOUS
Qty: 2 ML | Refills: 0 | Status: SHIPPED | OUTPATIENT
Start: 2025-01-13

## 2025-02-05 ENCOUNTER — OFFICE VISIT (OUTPATIENT)
Dept: PRIMARY CARE | Facility: CLINIC | Age: 55
End: 2025-02-05
Payer: COMMERCIAL

## 2025-02-05 VITALS
SYSTOLIC BLOOD PRESSURE: 133 MMHG | HEART RATE: 94 BPM | RESPIRATION RATE: 18 BRPM | HEIGHT: 70 IN | WEIGHT: 301.9 LBS | DIASTOLIC BLOOD PRESSURE: 83 MMHG | BODY MASS INDEX: 43.22 KG/M2 | OXYGEN SATURATION: 95 %

## 2025-02-05 DIAGNOSIS — R53.83 OTHER FATIGUE: Primary | ICD-10-CM

## 2025-02-05 DIAGNOSIS — Z00.00 ROUTINE GENERAL MEDICAL EXAMINATION AT A HEALTH CARE FACILITY: ICD-10-CM

## 2025-02-05 DIAGNOSIS — H65.192 OTHER NON-RECURRENT ACUTE NONSUPPURATIVE OTITIS MEDIA OF LEFT EAR: ICD-10-CM

## 2025-02-05 PROCEDURE — 99214 OFFICE O/P EST MOD 30 MIN: CPT | Performed by: INTERNAL MEDICINE

## 2025-02-05 PROCEDURE — 3075F SYST BP GE 130 - 139MM HG: CPT | Performed by: INTERNAL MEDICINE

## 2025-02-05 PROCEDURE — 4010F ACE/ARB THERAPY RXD/TAKEN: CPT | Performed by: INTERNAL MEDICINE

## 2025-02-05 PROCEDURE — 3008F BODY MASS INDEX DOCD: CPT | Performed by: INTERNAL MEDICINE

## 2025-02-05 PROCEDURE — 3079F DIAST BP 80-89 MM HG: CPT | Performed by: INTERNAL MEDICINE

## 2025-02-05 RX ORDER — AMOXICILLIN AND CLAVULANATE POTASSIUM 875; 125 MG/1; MG/1
875 TABLET, FILM COATED ORAL 2 TIMES DAILY
Qty: 10 TABLET | Refills: 0 | Status: SHIPPED | OUTPATIENT
Start: 2025-02-05 | End: 2025-02-10

## 2025-02-05 RX ORDER — HYDROCHLOROTHIAZIDE 12.5 MG/1
1 TABLET ORAL
COMMUNITY
Start: 2024-11-08 | End: 2025-02-05 | Stop reason: WASHOUT

## 2025-02-05 ASSESSMENT — ENCOUNTER SYMPTOMS
MYALGIAS: 1
FATIGUE: 1

## 2025-02-05 NOTE — PROGRESS NOTES
"Subjective   Patient ID: Vipul Townsend is a 54 y.o. male who presents for Establish Care (/).    Pt presents to get established from Dr. Rios office.    PMH:  -CAD: Had a heart attack age 39 and had a stent placed. Last heart cath was in 2019. Sees Dr. Guzman.  -PAF: Had pulmonary vein ablation. Has been in NSR ever since. Hasn't needed a blood thinner as a result.  -Knee problems: Has had 3 surgeries on his R knee and 2 on his L. Had these done at [facility] by Dr. Lisa.  -DM2: Taking metformin and mounjaro. Last A1c 6.6% 2/2024.  -ED: Has tried pills w/o success. Told by urology he would need a shot.  -Sinus congestion: Saw ENT for this last year. Occasionally uses afrin. Stopped using flonase.  -ABHIJIT: Has a CPAP.    For the last 3 weeks has felt exhausted. Is going to bed at 9am and sleeping in 2 hours more than usual. Has noticed he is urinating more than usual. Isn't sure if he has a kidney stone; feels like its the beginning of one. Aches throughout his body. Pt tested negative for COVID twice although his wife tested positive 3 weeks ago. Feels a little better today than he did 3 weeks ago. Finds the morning is the worst of symptoms.        Review of Systems   Constitutional:  Positive for fatigue.   HENT:  Positive for congestion.    Musculoskeletal:  Positive for myalgias.       /83 (BP Location: Right arm, Patient Position: Sitting)   Pulse 94   Resp 18   Ht 1.778 m (5' 10\")   Wt 137 kg (301 lb 14.4 oz)   SpO2 95%   BMI 43.32 kg/m²   Objective   Physical Exam  Constitutional:       General: He is not in acute distress.     Appearance: He is obese. He is not ill-appearing, toxic-appearing or diaphoretic.   HENT:      Head: Normocephalic and atraumatic.      Right Ear: Tympanic membrane, ear canal and external ear normal.      Left Ear: Tympanic membrane is erythematous and bulging.   Eyes:      General: No scleral icterus.     Conjunctiva/sclera: Conjunctivae normal.   Cardiovascular:      Rate and " Rhythm: Normal rate and regular rhythm.      Heart sounds: No murmur heard.     No friction rub. No gallop.   Pulmonary:      Effort: Pulmonary effort is normal. No respiratory distress.      Breath sounds: No stridor. No wheezing, rhonchi or rales.   Abdominal:      General: Abdomen is flat. Bowel sounds are normal. There is no distension.      Palpations: Abdomen is soft.      Tenderness: There is no abdominal tenderness. There is no guarding.   Musculoskeletal:      Cervical back: Normal range of motion and neck supple. No tenderness.   Lymphadenopathy:      Cervical: No cervical adenopathy.   Skin:     General: Skin is warm and dry.   Neurological:      Mental Status: He is alert.         Assessment/Plan   Problem List Items Addressed This Visit    None  Visit Diagnoses         Codes    Other fatigue    -  Primary R53.83    Other non-recurrent acute nonsuppurative otitis media of left ear     H65.192    Relevant Medications    amoxicillin-pot clavulanate (Augmentin) 875-125 mg tablet    Other Relevant Orders    Referral to ENT    Routine general medical examination at a health care facility     Z00.00    Relevant Orders    Comprehensive metabolic panel    CBC    Hemoglobin A1c    Albumin-Creatinine Ratio, Urine Random    Lipid panel    Tsh With Reflex To Free T4 If Abnormal        -Fatigue could be from ear infection seen on exam. May be related to COVID exposure from his wife (although pt tested negative). Will treat infection and get labwork as above to see if anything else could be contributing. Pt agreeable with plan. All questions answered.  -Pt in addition has had persistent sinus infections/issues since he was younger. Saw ENT last year but didn't feel like all concerns were addressed. Pt encouraged to start taking flonase consistently and I reviewed what to expect in terms of improvement from this. We will refer pt back to ENT to see what else can be done.         Toi Li MD 02/05/25 12:26 PM

## 2025-02-06 LAB
ALBUMIN SERPL-MCNC: 4.8 G/DL (ref 3.6–5.1)
ALBUMIN/CREAT UR: 9 MG/G CREAT
ALP SERPL-CCNC: 53 U/L (ref 35–144)
ALT SERPL-CCNC: 26 U/L (ref 9–46)
ANION GAP SERPL CALCULATED.4IONS-SCNC: 11 MMOL/L (CALC) (ref 7–17)
AST SERPL-CCNC: 18 U/L (ref 10–35)
BILIRUB SERPL-MCNC: 0.7 MG/DL (ref 0.2–1.2)
BUN SERPL-MCNC: 10 MG/DL (ref 7–25)
CALCIUM SERPL-MCNC: 10 MG/DL (ref 8.6–10.3)
CHLORIDE SERPL-SCNC: 100 MMOL/L (ref 98–110)
CHOLEST SERPL-MCNC: 105 MG/DL
CHOLEST/HDLC SERPL: 2.2 (CALC)
CO2 SERPL-SCNC: 29 MMOL/L (ref 20–32)
CREAT SERPL-MCNC: 0.77 MG/DL (ref 0.7–1.3)
CREAT UR-MCNC: 299 MG/DL (ref 20–320)
EGFRCR SERPLBLD CKD-EPI 2021: 107 ML/MIN/1.73M2
ERYTHROCYTE [DISTWIDTH] IN BLOOD BY AUTOMATED COUNT: 12.9 % (ref 11–15)
EST. AVERAGE GLUCOSE BLD GHB EST-MCNC: 166 MG/DL
EST. AVERAGE GLUCOSE BLD GHB EST-SCNC: 9.2 MMOL/L
GLUCOSE SERPL-MCNC: 170 MG/DL (ref 65–99)
HBA1C MFR BLD: 7.4 % OF TOTAL HGB
HCT VFR BLD AUTO: 46.4 % (ref 38.5–50)
HDLC SERPL-MCNC: 47 MG/DL
HGB BLD-MCNC: 15.6 G/DL (ref 13.2–17.1)
LDLC SERPL CALC-MCNC: 42 MG/DL (CALC)
MCH RBC QN AUTO: 30.1 PG (ref 27–33)
MCHC RBC AUTO-ENTMCNC: 33.6 G/DL (ref 32–36)
MCV RBC AUTO: 89.4 FL (ref 80–100)
MICROALBUMIN UR-MCNC: 2.7 MG/DL
NONHDLC SERPL-MCNC: 58 MG/DL (CALC)
PLATELET # BLD AUTO: 257 THOUSAND/UL (ref 140–400)
PMV BLD REES-ECKER: 11 FL (ref 7.5–12.5)
POTASSIUM SERPL-SCNC: 4.6 MMOL/L (ref 3.5–5.3)
PROT SERPL-MCNC: 7.6 G/DL (ref 6.1–8.1)
RBC # BLD AUTO: 5.19 MILLION/UL (ref 4.2–5.8)
SODIUM SERPL-SCNC: 140 MMOL/L (ref 135–146)
TRIGL SERPL-MCNC: 75 MG/DL
TSH SERPL-ACNC: 1.3 MIU/L (ref 0.4–4.5)
WBC # BLD AUTO: 8.2 THOUSAND/UL (ref 3.8–10.8)

## 2025-02-07 ENCOUNTER — TELEPHONE (OUTPATIENT)
Dept: PRIMARY CARE | Facility: CLINIC | Age: 55
End: 2025-02-07
Payer: COMMERCIAL

## 2025-02-07 DIAGNOSIS — E78.5 HYPERLIPIDEMIA, UNSPECIFIED HYPERLIPIDEMIA TYPE: ICD-10-CM

## 2025-02-07 DIAGNOSIS — I10 PRIMARY HYPERTENSION: ICD-10-CM

## 2025-02-07 RX ORDER — HYDROCHLOROTHIAZIDE 12.5 MG/1
12.5 TABLET ORAL DAILY
Qty: 90 TABLET | Refills: 3 | Status: SHIPPED | OUTPATIENT
Start: 2025-02-07

## 2025-02-08 DIAGNOSIS — E11.69 TYPE 2 DIABETES MELLITUS WITH OTHER SPECIFIED COMPLICATION, UNSPECIFIED WHETHER LONG TERM INSULIN USE (MULTI): ICD-10-CM

## 2025-02-08 RX ORDER — LOSARTAN POTASSIUM 100 MG/1
100 TABLET ORAL DAILY
Qty: 90 TABLET | Refills: 3 | Status: SHIPPED | OUTPATIENT
Start: 2025-02-08

## 2025-02-08 RX ORDER — ATORVASTATIN CALCIUM 20 MG/1
20 TABLET, FILM COATED ORAL DAILY
Qty: 90 TABLET | Refills: 3 | Status: SHIPPED | OUTPATIENT
Start: 2025-02-08

## 2025-02-10 RX ORDER — TIRZEPATIDE 7.5 MG/.5ML
INJECTION, SOLUTION SUBCUTANEOUS
Qty: 2 ML | Refills: 0 | Status: SHIPPED | OUTPATIENT
Start: 2025-02-10

## 2025-02-12 ENCOUNTER — APPOINTMENT (OUTPATIENT)
Facility: CLINIC | Age: 55
End: 2025-02-12
Payer: COMMERCIAL

## 2025-02-26 ENCOUNTER — TELEPHONE (OUTPATIENT)
Dept: CARDIOLOGY | Facility: CLINIC | Age: 55
End: 2025-02-26
Payer: COMMERCIAL

## 2025-02-26 NOTE — TELEPHONE ENCOUNTER
Vipul is calling for cardiac clearance for a procedure Tuesday March 4th. He has a Kidney stone. They want him cleared before the procedure. They sent over paperwork. You can reach him at :    [phone]

## 2025-03-14 DIAGNOSIS — I48.92 ATRIAL FLUTTER WITH RAPID VENTRICULAR RESPONSE (MULTI): Primary | ICD-10-CM

## 2025-03-14 RX ORDER — METOPROLOL TARTRATE 100 MG/1
100 TABLET ORAL 2 TIMES DAILY
Qty: 180 TABLET | Refills: 3 | Status: SHIPPED | OUTPATIENT
Start: 2025-03-14

## 2025-03-24 DIAGNOSIS — E11.69 TYPE 2 DIABETES MELLITUS WITH OTHER SPECIFIED COMPLICATION, UNSPECIFIED WHETHER LONG TERM INSULIN USE (MULTI): ICD-10-CM

## 2025-03-25 RX ORDER — TIRZEPATIDE 7.5 MG/.5ML
INJECTION, SOLUTION SUBCUTANEOUS
Qty: 2 ML | Refills: 0 | Status: SHIPPED | OUTPATIENT
Start: 2025-03-25

## 2025-04-19 DIAGNOSIS — E11.69 TYPE 2 DIABETES MELLITUS WITH OTHER SPECIFIED COMPLICATION, UNSPECIFIED WHETHER LONG TERM INSULIN USE (MULTI): ICD-10-CM

## 2025-04-23 RX ORDER — TIRZEPATIDE 7.5 MG/.5ML
INJECTION, SOLUTION SUBCUTANEOUS
Qty: 2 ML | Refills: 3 | Status: SHIPPED | OUTPATIENT
Start: 2025-04-23

## 2025-05-27 ENCOUNTER — APPOINTMENT (OUTPATIENT)
Dept: PRIMARY CARE | Facility: CLINIC | Age: 55
End: 2025-05-27
Payer: COMMERCIAL

## 2025-05-27 VITALS
HEART RATE: 85 BPM | WEIGHT: 305.2 LBS | HEIGHT: 70 IN | DIASTOLIC BLOOD PRESSURE: 75 MMHG | OXYGEN SATURATION: 95 % | BODY MASS INDEX: 43.69 KG/M2 | RESPIRATION RATE: 20 BRPM | SYSTOLIC BLOOD PRESSURE: 123 MMHG

## 2025-05-27 DIAGNOSIS — Z12.5 SCREENING FOR PROSTATE CANCER: ICD-10-CM

## 2025-05-27 DIAGNOSIS — J31.0 CHRONIC RHINITIS: ICD-10-CM

## 2025-05-27 DIAGNOSIS — E11.9 TYPE 2 DIABETES MELLITUS WITHOUT COMPLICATION, WITHOUT LONG-TERM CURRENT USE OF INSULIN: ICD-10-CM

## 2025-05-27 DIAGNOSIS — F51.01 PRIMARY INSOMNIA: ICD-10-CM

## 2025-05-27 DIAGNOSIS — Z23 IMMUNIZATION DUE: ICD-10-CM

## 2025-05-27 DIAGNOSIS — Z00.00 ANNUAL PHYSICAL EXAM: Primary | ICD-10-CM

## 2025-05-27 DIAGNOSIS — M54.2 ACUTE NECK PAIN: ICD-10-CM

## 2025-05-27 PROCEDURE — 4010F ACE/ARB THERAPY RXD/TAKEN: CPT | Performed by: INTERNAL MEDICINE

## 2025-05-27 PROCEDURE — 90471 IMMUNIZATION ADMIN: CPT | Performed by: INTERNAL MEDICINE

## 2025-05-27 PROCEDURE — 99212 OFFICE O/P EST SF 10 MIN: CPT | Performed by: INTERNAL MEDICINE

## 2025-05-27 PROCEDURE — 3008F BODY MASS INDEX DOCD: CPT | Performed by: INTERNAL MEDICINE

## 2025-05-27 PROCEDURE — 3078F DIAST BP <80 MM HG: CPT | Performed by: INTERNAL MEDICINE

## 2025-05-27 PROCEDURE — 90715 TDAP VACCINE 7 YRS/> IM: CPT | Performed by: INTERNAL MEDICINE

## 2025-05-27 PROCEDURE — 99396 PREV VISIT EST AGE 40-64: CPT | Performed by: INTERNAL MEDICINE

## 2025-05-27 PROCEDURE — 3074F SYST BP LT 130 MM HG: CPT | Performed by: INTERNAL MEDICINE

## 2025-05-27 PROCEDURE — 1036F TOBACCO NON-USER: CPT | Performed by: INTERNAL MEDICINE

## 2025-05-27 RX ORDER — ONDANSETRON 4 MG/1
1 TABLET, FILM COATED ORAL
COMMUNITY
Start: 2025-02-26 | End: 2025-05-27 | Stop reason: WASHOUT

## 2025-05-27 RX ORDER — KETOROLAC TROMETHAMINE 10 MG/1
1 TABLET, FILM COATED ORAL EVERY 6 HOURS PRN
COMMUNITY
Start: 2025-03-04

## 2025-05-27 RX ORDER — TIRZEPATIDE 10 MG/.5ML
10 INJECTION, SOLUTION SUBCUTANEOUS WEEKLY
Qty: 2 ML | Refills: 11 | Status: SHIPPED | OUTPATIENT
Start: 2025-05-27

## 2025-05-27 RX ORDER — TAMSULOSIN HYDROCHLORIDE 0.4 MG/1
1 CAPSULE ORAL
COMMUNITY
Start: 2025-02-20 | End: 2025-05-27 | Stop reason: WASHOUT

## 2025-05-27 ASSESSMENT — ENCOUNTER SYMPTOMS
CONSTIPATION: 0
BLOOD IN STOOL: 0
FATIGUE: 1
SHORTNESS OF BREATH: 0
DIZZINESS: 0
SLEEP DISTURBANCE: 1
HEADACHES: 1

## 2025-05-27 NOTE — ASSESSMENT & PLAN NOTE
-Is waking up every 2-3 hours. Sometimes has to urinate but doesn't always (doesn't have to urinate this frequently during the day either).  -Pt believes this is related to breathing issues from his sinuses. Uses his CPAP nightly without issue. We will see how ENT can help with this.  -Sleep hygiene pamphlet sent through [employer] for pt to be able to review.  -Discussed CBT or medications; pt would like to see how addressing his sinuses helps first.

## 2025-05-27 NOTE — PATIENT INSTRUCTIONS
The number to call for scheduling is [phone]. This is a general scheduling number, but you can call it for ENT. Ask for Dr. Baker or Dr. Gavin.

## 2025-05-27 NOTE — ASSESSMENT & PLAN NOTE
Pt taking metformin 1g bid and mounjaro 7.5mg weekly. Doesn't feel like mounjaro is helping the way it used to with appetite. Hasn't lost weight in 6 months. We agreed to repeat A1c and increase dose to 10mg weekly.  -Last A1c in February was 7.4; worsened from 6.6% last year. Pt states he has made dietary adjustments since.

## 2025-05-27 NOTE — PROGRESS NOTES
"Subjective   Patient ID: Vipul Townsend is a 54 y.o. male who presents for Annual Exam.    Pt here for physical. Overall is doing well.    Is concerned about his father who was hospitalized from [facility].    Is waking up almost every 2 hours. Will use the bathroom since otherwise he can't fall back asleep (doesn't always feel like he has to go though). Can fall back asleep somewhat easily (most of the time). Will put headphones on with radio running which helps. Has been like this for years. Still feels OK in the morning, but tires easily as the day goes on.    Has been using flonase since last visit. Hasn't had another sinus infection, but notes that headache frequency hasn't improved (normally gets them from sinuses). Does take zyrtec daily as well.    Has reduced alcohol consumption which has helped with most of his stomach problems.    Has had worsened neck pain last 4 months. Has had issues for years, but it acutely worsened during this time. He tried raising his computer to see if that helped but it has not.        Review of Systems   Constitutional:  Positive for fatigue.   Respiratory:  Negative for shortness of breath.    Cardiovascular:  Negative for chest pain.   Gastrointestinal:  Negative for blood in stool and constipation.   Neurological:  Positive for headaches (sinus). Negative for dizziness.   Psychiatric/Behavioral:  Positive for sleep disturbance.        /75 (BP Location: Right arm, Patient Position: Sitting)   Pulse 85   Resp 20   Ht 1.778 m (5' 10\")   Wt 138 kg (305 lb 3.2 oz)   SpO2 95%   BMI 43.79 kg/m²   Objective   Physical Exam  Constitutional:       General: He is not in acute distress.     Appearance: He is not ill-appearing, toxic-appearing or diaphoretic.   HENT:      Head: Normocephalic and atraumatic.   Eyes:      Conjunctiva/sclera: Conjunctivae normal.   Cardiovascular:      Rate and Rhythm: Normal rate and regular rhythm.      Heart sounds: No murmur heard.     No friction " rub. No gallop.   Pulmonary:      Effort: Pulmonary effort is normal. No respiratory distress.      Breath sounds: No stridor. No wheezing, rhonchi or rales.   Abdominal:      Comments: Mildly diminished bowel sounds diffusely   Neurological:      Mental Status: He is alert.         Assessment/Plan   Problem List Items Addressed This Visit           ICD-10-CM    Diabetes mellitus (Multi) E11.9    Pt taking metformin 1g bid and mounjaro 7.5mg weekly. Doesn't feel like mounjaro is helping the way it used to with appetite. Hasn't lost weight in 6 months. We agreed to repeat A1c and increase dose to 10mg weekly.  -Last A1c in February was 7.4; worsened from 6.6% last year. Pt states he has made dietary adjustments since.         Relevant Medications    tirzepatide (Mounjaro) 10 mg/0.5 mL pen injector    Other Relevant Orders    Hemoglobin A1c    Referral to Ophthalmology    Chronic rhinitis J31.0    -Last visit discussed starting flonase regularly and seeing ENT. Pt has been taking flonase daily since and zyrtec w/o symptom improvement. Feels like this impacts his ability to sleep as well.  -Discussed providers he can see with ENT to address this. Pt to schedule appt when able.         Primary insomnia F51.01    -Is waking up every 2-3 hours. Sometimes has to urinate but doesn't always (doesn't have to urinate this frequently during the day either).  -Pt believes this is related to breathing issues from his sinuses. Uses his CPAP nightly without issue. We will see how ENT can help with this.  -Sleep hygiene pamphlet sent through [employer] for pt to be able to review.  -Discussed CBT or medications; pt would like to see how addressing his sinuses helps first.          Other Visit Diagnoses         Codes      Annual physical exam    -  Primary Z00.00      Immunization due     Z23    Relevant Orders    Tdap vaccine, age 7 years and older (Completed)      Acute neck pain     M54.2    Relevant Orders    XR cervical spine 2-3  views    Referral to Physical Therapy      Screening for prostate cancer     Z12.5    Relevant Orders    PSA        -Labwork as above.  -TDAP given today. Discussed shingles; pt to get whena ble.  -Given neck pain will start with XR and PT. If this doesn't help may need to see specialist afterwards.         Toi Li MD 05/27/25 9:49 AM

## 2025-05-27 NOTE — ASSESSMENT & PLAN NOTE
-Last visit discussed starting flonase regularly and seeing ENT. Pt has been taking flonase daily since and zyrtec w/o symptom improvement. Feels like this impacts his ability to sleep as well.  -Discussed providers he can see with ENT to address this. Pt to schedule appt when able.

## 2025-05-28 ENCOUNTER — TELEPHONE (OUTPATIENT)
Dept: PRIMARY CARE | Facility: CLINIC | Age: 55
End: 2025-05-28
Payer: COMMERCIAL

## 2025-05-28 LAB
EST. AVERAGE GLUCOSE BLD GHB EST-MCNC: 200 MG/DL
EST. AVERAGE GLUCOSE BLD GHB EST-SCNC: 11.1 MMOL/L
HBA1C MFR BLD: 8.6 %
PSA SERPL-MCNC: 0.63 NG/ML

## 2025-06-05 ENCOUNTER — APPOINTMENT (OUTPATIENT)
Dept: CARDIOLOGY | Facility: CLINIC | Age: 55
End: 2025-06-05
Payer: COMMERCIAL

## 2025-06-05 VITALS
BODY MASS INDEX: 43.67 KG/M2 | OXYGEN SATURATION: 96 % | HEIGHT: 70 IN | SYSTOLIC BLOOD PRESSURE: 122 MMHG | HEART RATE: 81 BPM | WEIGHT: 305 LBS | DIASTOLIC BLOOD PRESSURE: 70 MMHG

## 2025-06-05 DIAGNOSIS — I48.0 PAROXYSMAL ATRIAL FIBRILLATION WITH RVR (MULTI): ICD-10-CM

## 2025-06-05 DIAGNOSIS — I10 PRIMARY HYPERTENSION: ICD-10-CM

## 2025-06-05 DIAGNOSIS — Z95.5 PRESENCE OF STENT IN CORONARY ARTERY: ICD-10-CM

## 2025-06-05 DIAGNOSIS — E78.5 HYPERLIPIDEMIA, UNSPECIFIED HYPERLIPIDEMIA TYPE: ICD-10-CM

## 2025-06-05 DIAGNOSIS — I25.10 CORONARY ARTERY DISEASE INVOLVING NATIVE CORONARY ARTERY OF NATIVE HEART WITHOUT ANGINA PECTORIS: Primary | ICD-10-CM

## 2025-06-05 PROBLEM — R94.39 ABNORMAL THALLIUM STRESS TEST: Status: RESOLVED | Noted: 2024-07-22 | Resolved: 2025-06-05

## 2025-06-05 PROCEDURE — 1036F TOBACCO NON-USER: CPT | Performed by: INTERNAL MEDICINE

## 2025-06-05 PROCEDURE — 3008F BODY MASS INDEX DOCD: CPT | Performed by: INTERNAL MEDICINE

## 2025-06-05 PROCEDURE — 4010F ACE/ARB THERAPY RXD/TAKEN: CPT | Performed by: INTERNAL MEDICINE

## 2025-06-05 PROCEDURE — 3074F SYST BP LT 130 MM HG: CPT | Performed by: INTERNAL MEDICINE

## 2025-06-05 PROCEDURE — 99212 OFFICE O/P EST SF 10 MIN: CPT | Performed by: INTERNAL MEDICINE

## 2025-06-05 PROCEDURE — 99213 OFFICE O/P EST LOW 20 MIN: CPT | Performed by: INTERNAL MEDICINE

## 2025-06-05 PROCEDURE — 3078F DIAST BP <80 MM HG: CPT | Performed by: INTERNAL MEDICINE

## 2025-06-05 RX ORDER — SILDENAFIL CITRATE 20 MG/1
TABLET ORAL
COMMUNITY
Start: 2025-06-02

## 2025-06-05 NOTE — PROGRESS NOTES
"Subjective   Vipul Townsend is a 54 y.o. male.    Chief Complaint:  Follow-up coronary disease atrial fibrillation.    HPI    He continues to do well.  He is maintaining his weight loss.  Has lost about 80 pounds.  No anginal symptoms.  No increased shortness of breath or dyspnea.  Activity levels are improving.  No problems on medications.  No other medical issues since his last visit    Cardiac catheterization in December 2019 which demonstrated mild coronary artery disease and patent stents in the left anterior descending coronary artery.     In 2020 the patient underwent a pulmonary vein isolation procedure. Subsequent to that he has been able to maintain sinus rhythm.     A stress thallium study in early December 2019 showed a left ventricular ejection fraction of 38%. An echocardiographic study performed on June 2, 2020 showed normal left ventricular systolic function with no significant valvular disease.     Cardiac catheterization in 2012 showed a /99 percent left anterior descending coronary artery treated with a drug-eluting stent.     He has obstructive sleep apnea and uses a cpap mask.     Allergies  Medication    · Trulicity     Family History  Mother    · Family history of diabetes mellitus (V18.0) (Z83.3)  Father    · Family history of myocardial infarction (V17.3) (Z82.49)   · Family history of hypertension (V17.49) (Z82.49)     Social History  Problems    · Former smoker (V15.82) (Z87.891)   · quit 2009   · History of marijuana use (305.23) (F12.91)   · quit 2015   ·    · Moderate alcohol use     Review of Systems   Constitutional: Positive for weight loss.   Cardiovascular:  Positive for dyspnea on exertion.   Musculoskeletal:  Positive for arthritis.   All other systems reviewed and are negative.    Current Medications[1]     Visit Vitals  /70 (BP Location: Left arm)   Pulse 81   Ht 1.778 m (5' 10\")   Wt 138 kg (305 lb)   SpO2 96%   BMI 43.76 kg/m²   Smoking Status Former   BSA 2.61 m² "        Objective     Constitutional:       Appearance: Not in distress.   Neck:      Vascular: JVD normal.   Pulmonary:      Breath sounds: Normal breath sounds.   Cardiovascular:      Normal rate. Regular rhythm. S1 with normal intensity. S2 with normal intensity.       Murmurs: There is no murmur.      No gallop.    Pulses:     Intact distal pulses.   Edema:     Peripheral edema absent.   Abdominal:      General: Bowel sounds are normal.   Neurological:      Mental Status: Alert and oriented to person, place and time.         Lab Review:   Lab Results   Component Value Date     02/05/2025    K 4.6 02/05/2025     02/05/2025    CO2 29 02/05/2025    BUN 10 02/05/2025    CREATININE 0.77 02/05/2025    GLUCOSE 170 (H) 02/05/2025    CALCIUM 10.0 02/05/2025     Lab Results   Component Value Date    CHOL 105 02/05/2025    TRIG 75 02/05/2025    HDL 47 02/05/2025       Assessment:    1.  Coronary disease.  No anginal symptoms.  Continue medical management.    2.  Paroxysmal atrial fibrillation.  Status post pulmonary vein isolation procedure.  Continues in sinus rhythm.  We have discontinued anticoagulation.    3.  Hypertension.  Blood pressures are excellent.    4.  Hyperlipidemia.  Cholesterol 105, HDL 47, LDL 42.         [1]   Current Outpatient Medications   Medication Sig Dispense Refill    atorvastatin (Lipitor) 20 mg tablet TAKE ONE TABLET BY MOUTH EVERY DAY 90 tablet 3    blood sugar diagnostic (GenStrip Test Strip) strip Test once daily as directed 100 strip 3    blood-glucose meter misc Test glucose as directed 1 each 0    ketorolac (Toradol) 10 mg tablet Take 1 tablet (10 mg) by mouth every 6 hours if needed for pain.      lancets misc Test once daily as directed 100 each 3    losartan (Cozaar) 100 mg tablet TAKE ONE TABLET BY MOUTH EVERY DAY 90 tablet 3    metFORMIN  mg 24 hr tablet TAKE TWO TABLETS BY MOUTH TWO TIMES A  tablet 3    metoprolol tartrate (Lopressor) 100 mg tablet TAKE ONE  TABLET BY MOUTH TWO TIMES A  tablet 3    sildenafil (Revatio) 20 mg tablet       tirzepatide (Mounjaro) 10 mg/0.5 mL pen injector Inject 10 mg under the skin 1 (one) time per week. 2 mL 11    fluticasone (Flonase) 50 mcg/actuation nasal spray Administer 2 sprays into each nostril once daily. Shake gently. Before first use, prime pump. After use, clean tip and replace cap. 16 g 5     No current facility-administered medications for this visit.

## 2025-06-24 DIAGNOSIS — R19.7 DIARRHEA, UNSPECIFIED TYPE: Primary | ICD-10-CM

## 2025-07-03 ENCOUNTER — APPOINTMENT (OUTPATIENT)
Dept: GASTROENTEROLOGY | Facility: CLINIC | Age: 55
End: 2025-07-03
Payer: COMMERCIAL

## 2025-07-03 VITALS
HEIGHT: 72 IN | SYSTOLIC BLOOD PRESSURE: 124 MMHG | DIASTOLIC BLOOD PRESSURE: 86 MMHG | BODY MASS INDEX: 40.63 KG/M2 | HEART RATE: 85 BPM | WEIGHT: 300 LBS

## 2025-07-03 DIAGNOSIS — R19.7 DIARRHEA, UNSPECIFIED TYPE: ICD-10-CM

## 2025-07-03 DIAGNOSIS — R14.0 BLOATING: Primary | ICD-10-CM

## 2025-07-03 PROCEDURE — 1036F TOBACCO NON-USER: CPT | Performed by: INTERNAL MEDICINE

## 2025-07-03 PROCEDURE — 3074F SYST BP LT 130 MM HG: CPT | Performed by: INTERNAL MEDICINE

## 2025-07-03 PROCEDURE — 4010F ACE/ARB THERAPY RXD/TAKEN: CPT | Performed by: INTERNAL MEDICINE

## 2025-07-03 PROCEDURE — 99204 OFFICE O/P NEW MOD 45 MIN: CPT | Performed by: INTERNAL MEDICINE

## 2025-07-03 PROCEDURE — 3008F BODY MASS INDEX DOCD: CPT | Performed by: INTERNAL MEDICINE

## 2025-07-03 PROCEDURE — 3079F DIAST BP 80-89 MM HG: CPT | Performed by: INTERNAL MEDICINE

## 2025-07-03 RX ORDER — OMEPRAZOLE 40 MG/1
40 CAPSULE, DELAYED RELEASE ORAL DAILY
Qty: 30 CAPSULE | Refills: 11 | Status: SHIPPED | OUTPATIENT
Start: 2025-07-03 | End: 2026-07-03

## 2025-07-03 NOTE — PROGRESS NOTES
Department of Gastroenterology & Hepatology  Initial Consult Note  Date of Service:  July 3, 2025         Patient: Vipul Townsend    Medical Record: [id number]  Reason for Admission / Consultation: diarrhea  Gastroenterology Attending: Bruno Smyth MD  Referring Provider: MD Toi Odonnell MD  [address] [facility]  [address]  [place] [address]         My final recommendations will be communicated back to the requesting physician by way of shared medical record or letter via US mail         Impression: 54-year-old male with past medical history of diabetes on metformin and Mounjaro, history of alcohol use quit 9 months ago, obesity here for abdominal bloating and diarrhea    Abdominal bloating  This could be multifactorial including gastritis/peptic ulcer disease/IBS/from Mounjaro  I will start him on omeprazole 40 mg daily  I will also check a celiac panel and get a right upper quadrant ultrasound  No improvement in symptoms we will get EGD with biopsies to rule out H. pylori and celiac    Diarrhea  Off-and-on for more than a year  Was initially getting worse with increased dose of Mounjaro but now better  Had normal colonoscopy in 2023  This could be IBS or could be from bacterial overgrowth  I will start him on daily Benefiber and check fecal calprotectin  If no improvement will get breath test and try low FODMAP diet     Follow-up in 8 to 12 weeks    Bruno Smyth MD  Gastroenterology, Hepatology and Nutrition  [facility]  =========================================================================  History of Present Illness:     Vipul Townsend  is a 54 y.o.   has a past medical history of Abnormal result of other cardiovascular function study, Calculus of ureter, Ear problems, Fracture of nasal bones, Migraine, Nosebleed, Other specified disorders of Eustachian tube, unspecified ear, Personal history of other diseases of the respiratory system, Sinusitis, Sleep apnea, and Tinnitus. who  presents for abdominal bloating and diarrhea.  Patient states that he has diarrhea for many years which he describes as having 1-2 soft bowel movement per day.  He was started on metformin many years ago and that increase his bowel frequency to 2 to 3/day.  And then he was started on Mounjaro and he started having 4-6 bowel movement per day which were soft to watery.  Recently his Mounjaro was increased in dose and his diarrhea got worse.  He also complains of abdominal bloating with rare cramping that does tend to get better after bowel movement.  Fatty and fried food made his bloating worse . he feels bloated 20 to 30 minutes after eating and has to rush to the bathroom.  Denies any episodes of stool incontinence.  He did not take Mounjaro last week and his diarrhea is already better and is having 2-3 bowel movements per day.  Was also having excessive burping which has gotten better since last week.       Was drinking 4 drinks of whiskey a day , stopped 9 months ago     Colonoscopy June 2023  - Hemorrhoids found on perianal exam.                         - One 15 mm polyp in the cecum, removed piecemeal                          using a cold snare. Resected and retrieved.                         - One 5 mm polyp in the transverse colon, removed with                          a cold snare. Resected and retrieved.                         - One 5 mm polyp in the ascending colon, removed with                          a cold snare. Resected and retrieved.                         - Two 3 to 4 mm polyps in the descending colon,        Pertinent Review of Systems:    Per HPI rest 12 point ROS negative      Past Medical History:    Medical History[1]     Past Surgical History:  Surgical History[2]     Family History:  Family History[3]     Social History:  Social History     Tobacco Use    Smoking status: Former     Types: Cigarettes    Smokeless tobacco: Former     Types: Snuff    Tobacco comments:     dip   Substance Use  Topics    Alcohol use: Yes     Alcohol/week: 2.0 standard drinks of alcohol     Types: 2 Standard drinks or equivalent per week     Comment: social        Allergies:  RX Allergies[4]      Medications:  Medications Ordered Prior to Encounter[5]         Physical Exam:  /86 (BP Location: Left arm, Patient Position: Sitting)   Pulse 85   Ht 1.829 m (6')   Wt 136 kg (300 lb)   BMI 40.69 kg/m²    General appearance: well appearing, alert, in no acute distress  Skin:  no rashes or lesions  Head: normal  Eyes: Anicteric sclera.   ENT: No oral erythema  Lungs: lungs clear to auscultation, no wheezing or rhonchi  Heart: RRR without murmur  Abdomen:  Abdomen soft, non-tender. Bowel sounds normal.   Extremities: Extremities normal.   Musculoskeletal: Muscular strength grossly intact  Neuro: CN2-12 grossly intact     Labs:  Current Medications[6]       SIGNATURE: Bruno Smyth MD PATIENT NAME: Vipul Townsend   DATE: July 3, 2025 MRN: [id number]   TIME: 3:26 PM           [1]   Past Medical History:  Diagnosis Date    Abnormal result of other cardiovascular function study     Abnormal nuclear stress test    Calculus of ureter     Ureteral calculi    Ear problems     Fracture of nasal bones     Migraine     Nosebleed     Other specified disorders of Eustachian tube, unspecified ear     Eustachian tube dysfunction    Personal history of other diseases of the respiratory system     History of pharyngitis    Sinusitis     Sleep apnea     Tinnitus    [2]   Past Surgical History:  Procedure Laterality Date    BACK SURGERY  10/24/2018    Back Surgery    CARDIAC CATHETERIZATION      KNEE SURGERY  10/24/2018    Knee Surgery    SINUS SURGERY  2005   [3]   Family History  Problem Relation Name Age of Onset    Diabetes Mother Mom     Heart disease Father Dad     Heart attack Father Dad     Diabetes Father Dad     Benign prostatic hyperplasia Father Dad     Diabetes Sister      Diabetes Maternal Grandmother      Hypertension Maternal  Grandfather Grandma     Heart failure Paternal Grandfather Naye    [4] No Known Allergies  [5]   Current Outpatient Medications on File Prior to Visit   Medication Sig Dispense Refill    atorvastatin (Lipitor) 20 mg tablet TAKE ONE TABLET BY MOUTH EVERY DAY 90 tablet 3    blood sugar diagnostic (GenStrip Test Strip) strip Test once daily as directed 100 strip 3    blood-glucose meter misc Test glucose as directed 1 each 0    ketorolac (Toradol) 10 mg tablet Take 1 tablet (10 mg) by mouth every 6 hours if needed for pain.      lancets misc Test once daily as directed 100 each 3    losartan (Cozaar) 100 mg tablet TAKE ONE TABLET BY MOUTH EVERY DAY 90 tablet 3    metFORMIN  mg 24 hr tablet TAKE TWO TABLETS BY MOUTH TWO TIMES A  tablet 3    metoprolol tartrate (Lopressor) 100 mg tablet TAKE ONE TABLET BY MOUTH TWO TIMES A  tablet 3    sildenafil (Revatio) 20 mg tablet       tirzepatide (Mounjaro) 10 mg/0.5 mL pen injector Inject 10 mg under the skin 1 (one) time per week. 2 mL 11    fluticasone (Flonase) 50 mcg/actuation nasal spray Administer 2 sprays into each nostril once daily. Shake gently. Before first use, prime pump. After use, clean tip and replace cap. 16 g 5     No current facility-administered medications on file prior to visit.   [6]   Current Outpatient Medications:     atorvastatin (Lipitor) 20 mg tablet, TAKE ONE TABLET BY MOUTH EVERY DAY, Disp: 90 tablet, Rfl: 3    blood sugar diagnostic (GenStrip Test Strip) strip, Test once daily as directed, Disp: 100 strip, Rfl: 3    blood-glucose meter misc, Test glucose as directed, Disp: 1 each, Rfl: 0    ketorolac (Toradol) 10 mg tablet, Take 1 tablet (10 mg) by mouth every 6 hours if needed for pain., Disp: , Rfl:     lancets misc, Test once daily as directed, Disp: 100 each, Rfl: 3    losartan (Cozaar) 100 mg tablet, TAKE ONE TABLET BY MOUTH EVERY DAY, Disp: 90 tablet, Rfl: 3    metFORMIN  mg 24 hr tablet, TAKE TWO TABLETS BY MOUTH  TWO TIMES A DAY, Disp: 360 tablet, Rfl: 3    metoprolol tartrate (Lopressor) 100 mg tablet, TAKE ONE TABLET BY MOUTH TWO TIMES A DAY, Disp: 180 tablet, Rfl: 3    sildenafil (Revatio) 20 mg tablet, , Disp: , Rfl:     tirzepatide (Mounjaro) 10 mg/0.5 mL pen injector, Inject 10 mg under the skin 1 (one) time per week., Disp: 2 mL, Rfl: 11    fluticasone (Flonase) 50 mcg/actuation nasal spray, Administer 2 sprays into each nostril once daily. Shake gently. Before first use, prime pump. After use, clean tip and replace cap., Disp: 16 g, Rfl: 5    omeprazole (PriLOSEC) 40 mg DR capsule, Take 1 capsule (40 mg) by mouth once daily. Do not crush or chew., Disp: 30 capsule, Rfl: 11

## 2025-07-03 NOTE — PATIENT INSTRUCTIONS
Start omeprazole 40 mg once daily  Take Benefiber 1 teaspoon daily mixed with 4-6 ounces of liquid  Blood and stool test   Abdominal ultrasound

## 2025-09-17 ENCOUNTER — APPOINTMENT (OUTPATIENT)
Dept: PRIMARY CARE | Facility: CLINIC | Age: 55
End: 2025-09-17
Payer: COMMERCIAL

## 2025-10-23 ENCOUNTER — APPOINTMENT (OUTPATIENT)
Dept: GASTROENTEROLOGY | Facility: CLINIC | Age: 55
End: 2025-10-23
Payer: COMMERCIAL

## 2025-10-30 ENCOUNTER — APPOINTMENT (OUTPATIENT)
Dept: GASTROENTEROLOGY | Facility: CLINIC | Age: 55
End: 2025-10-30
Payer: COMMERCIAL